# Patient Record
Sex: FEMALE | Race: WHITE | NOT HISPANIC OR LATINO | Employment: FULL TIME | URBAN - NONMETROPOLITAN AREA
[De-identification: names, ages, dates, MRNs, and addresses within clinical notes are randomized per-mention and may not be internally consistent; named-entity substitution may affect disease eponyms.]

---

## 2017-01-13 ENCOUNTER — OFFICE VISIT (OUTPATIENT)
Dept: FAMILY MEDICINE CLINIC | Facility: CLINIC | Age: 37
End: 2017-01-13

## 2017-01-13 VITALS
TEMPERATURE: 97.4 F | HEIGHT: 60 IN | DIASTOLIC BLOOD PRESSURE: 78 MMHG | SYSTOLIC BLOOD PRESSURE: 122 MMHG | BODY MASS INDEX: 34.36 KG/M2 | WEIGHT: 175 LBS | OXYGEN SATURATION: 98 % | HEART RATE: 88 BPM

## 2017-01-13 DIAGNOSIS — R53.83 FATIGUE, UNSPECIFIED TYPE: ICD-10-CM

## 2017-01-13 DIAGNOSIS — R61 NIGHT SWEATS: ICD-10-CM

## 2017-01-13 DIAGNOSIS — M54.50 ACUTE LOW BACK PAIN WITHOUT SCIATICA, UNSPECIFIED BACK PAIN LATERALITY: Primary | ICD-10-CM

## 2017-01-13 LAB
ALBUMIN SERPL-MCNC: 4.2 GM/DL (ref 3.2–5.5)
ALP SERPL-CCNC: 82 U/L (ref 15–121)
ALT SERPL-CCNC: 16 U/L (ref 10–60)
ANION GAP SERPL CALCULATED.3IONS-SCNC: 10 MMOL/L (ref 5–15)
AST SERPL-CCNC: 16 U/L (ref 10–60)
BASOPHILS NFR BLD AUTO: 0.6 % (ref 0–2)
BILIRUB BLD-MCNC: NEGATIVE MG/DL
BILIRUB SERPL-MCNC: 0.7 MG/DL (ref 0.2–1)
BUN SERPL-MCNC: 26 MG/DL (ref 8–25)
CALCIUM SERPL-MCNC: 9.3 MG/DL (ref 8.4–10.8)
CHLORIDE SERPL-SCNC: 102 MMOL/L (ref 100–112)
CLARITY, POC: CLEAR
CO2 SERPL-SCNC: 27 MMOL/L (ref 20–32)
COLOR UR: YELLOW
CREAT SERPL-MCNC: 1.3 MG/DL (ref 0.4–1.3)
EOSINOPHIL NFR BLD AUTO: 2.7 % (ref 0–7)
ERYTHROCYTE [DISTWIDTH] IN BLOOD: 14.2 % (ref 11.5–14.5)
GLUCOSE SERPL-MCNC: 123 MG/DL (ref 70–100)
GLUCOSE UR STRIP-MCNC: NEGATIVE MG/DL
GRANULOCYTES NFR BLD AUTO: 56.5 % (ref 37–80)
HCT VFR BLD CALC: 39.9 % (ref 35–45)
HGB BLD-MCNC: 13.1 GM/DL (ref 12–15.5)
KETONES UR QL: NEGATIVE
LEUKOCYTE EST, POC: NEGATIVE
LYMPHOCYTES NFR BLD AUTO: 31.1 % (ref 10–50)
MCH RBC QN: 29.3 PG (ref 26–34)
MCHC RBC-ENTMCNC: 32.8 GM/DL (ref 31.4–36)
MCV RBC: 89.3 FL (ref 80–98)
MONOCYTES NFR BLD AUTO: 9.1 % (ref 0–12)
NITRITE UR-MCNC: NEGATIVE MG/ML
NRBC BLD AUTO-RTO: 0 %
NRBC SPEC MANUAL: 0
PH UR: 5 [PH] (ref 5–8)
PLATELET # BLD: 364 X1000/MM3 (ref 150–450)
PMV BLD: 10 FL (ref 8–12)
POTASSIUM SERPL-SCNC: 3.6 MMOL/L (ref 3.4–5.4)
PROT SERPL-MCNC: 7.5 GM/DL (ref 6.7–8.2)
PROT UR STRIP-MCNC: NEGATIVE MG/DL
RBC # BLD: 4.47 MEGA/MM3 (ref 3.77–5.16)
RBC # UR STRIP: ABNORMAL /UL
SODIUM SERPL-SCNC: 139 MMOL/L (ref 134–146)
SP GR UR: 1.01 (ref 1–1.03)
UROBILINOGEN UR QL: NORMAL
WBC # BLD: 10.6 X1000/UL (ref 3.2–9.8)

## 2017-01-13 PROCEDURE — 99213 OFFICE O/P EST LOW 20 MIN: CPT | Performed by: NURSE PRACTITIONER

## 2017-01-13 PROCEDURE — 81003 URINALYSIS AUTO W/O SCOPE: CPT | Performed by: NURSE PRACTITIONER

## 2017-01-13 RX ORDER — ONDANSETRON 4 MG/1
4 TABLET, FILM COATED ORAL EVERY 8 HOURS PRN
Qty: 30 TABLET | Refills: 0 | Status: SHIPPED | OUTPATIENT
Start: 2017-01-13 | End: 2017-02-07

## 2017-01-13 RX ORDER — DOXYCYCLINE HYCLATE 100 MG/1
100 CAPSULE ORAL 2 TIMES DAILY
Qty: 28 CAPSULE | Refills: 0 | Status: SHIPPED | OUTPATIENT
Start: 2017-01-13 | End: 2017-01-27

## 2017-01-13 NOTE — MR AVS SNAPSHOT
Narcisa Maurice   1/13/2017 1:15 PM   Office Visit    Dept Phone:  [phone]   Encounter #:  [id number]    Provider:  LUIS MIGUEL Hernandez   Department:  [facility]                Your Full Care Plan              Today's Medication Changes          These changes are accurate as of: 1/13/17  2:42 PM.  If you have any questions, ask your nurse or doctor.               New Medication(s)Ordered:     doxycycline 100 MG capsule   Commonly known as:  VIBRAMYCIN   Take 1 capsule by mouth 2 (Two) Times a Day for 14 days.   Started by:  LUIS MIGUEL Hernandez       ondansetron 4 MG tablet   Commonly known as:  ZOFRAN   Take 1 tablet by mouth Every 8 (Eight) Hours As Needed for nausea or vomiting.   Started by:  LUIS MIGUEL Hernandez            Where to Get Your Medications      These medications were sent to [facility] [address] [phone] [facility] [phone] [address] [address]     Phone:  [phone]     doxycycline 100 MG capsule    ondansetron 4 MG tablet                  Your Updated Medication List          This list is accurate as of: 1/13/17  2:42 PM.  Always use your most recent med list.                buPROPion  MG 24 hr tablet   Commonly known as:  WELLBUTRIN XL   Take 1 tablet by mouth Daily.       doxycycline 100 MG capsule   Commonly known as:  VIBRAMYCIN   Take 1 capsule by mouth 2 (Two) Times a Day for 14 days.       fexofenadine 180 MG tablet   Commonly known as:  ALLEGRA       ondansetron 4 MG tablet   Commonly known as:  ZOFRAN   Take 1 tablet by mouth Every 8 (Eight) Hours As Needed for nausea or vomiting.               We Performed the Following     CBC & Differential     Comprehensive Metabolic Panel     Cytomegalovirus Antibody, IgG     Cytomegalovirus Antibody, IgM     Hortencia-Barr Virus VCA Antibody Panel     POC Urinalysis Dipstick, Automated       You Were  "Diagnosed With        Codes Comments    Acute low back pain without sciatica, unspecified back pain laterality    -  Primary ICD-10-CM: M54.5  ICD-9-CM: 724.2     Night sweats     ICD-10-CM: R61  ICD-9-CM: 780.8     Fatigue, unspecified type     ICD-10-CM: R53.83  ICD-9-CM: 780.79       Instructions     None    Patient Instructions History      Upcoming Appointments     Visit Type Date Time Department    [facility]      Bundlr Signup     [facility] Bundlr allows you to send messages to your doctor, view your test results, renew your prescriptions, schedule appointments, and more. To sign up, go to [employer] and click on the Sign Up Now link in the New User? box. Enter your Bundlr Activation Code exactly as it appears below along with the last four digits of your Social Security Number and your Date of Birth () to complete the sign-up process. If you do not sign up before the expiration date, you must request a new code.    B[employer]lr Activation Code: [email or web address]  Expires: 2017  2:42 PM    If you have questions, you can email [email or web address] or call [phone] to talk to our Bundlr staff. Remember, Bundlr is NOT to be used for urgent needs. For medical emergencies, dial 911.               Other Info from Your Visit           Allergies     Neomycin-bacitracin Zn-polymyx  Rash    Neosporin Plus Pain Relief Ms [Neomycin-polymyxin-pramoxine]  Rash      Reason for Visit     Back Pain x 2 days.     Night Sweats x2 weeks      Vital Signs     Blood Pressure Pulse Temperature Height    122/78 (BP Location: Left arm, Patient Position: Sitting, Cuff Size: Adult) 88 97.4 °F (36.3 °C) (Temporal Artery ) 60\" (152.4 cm)    Weight Oxygen Saturation Body Mass Index Smoking Status    175 lb (79.4 kg) 98% 34.18 kg/m2 Never Smoker      Problems and Diagnoses Noted     Acute low back pain without sciatica, unspecified back pain laterality    -  " Primary    Night sweats        Tiredness          Results     POC Urinalysis Dipstick, Automated      Component Value Standard Range & Units    Color Yellow Yellow, Straw, Dark Yellow, Erin    Clarity, UA Clear Clear    Glucose, UA Negative Negative, 1000 mg/dL (3+) mg/dL    Bilirubin Negative Negative    Ketones, UA Negative Negative    Specific Gravity  1.015 1.005 - 1.030    Blood, UA Trace Negative    pH, Urine 5.0 5.0 - 8.0    Protein, POC Negative Negative mg/dL    Urobilinogen, UA Normal Normal    Leukocytes Negative Negative    Nitrite, UA Negative Negative

## 2017-01-13 NOTE — PROGRESS NOTES
Chief Complaint   Patient presents with   • Back Pain     x 2 days.    • Night Sweats     x2 weeks       Subjective   HPI:   Narcisa Maurice is a 36 y.o. female presents to the office for evaluation of:  Back Pain   This is a new problem. The current episode started yesterday. The problem occurs constantly. The problem has been gradually worsening since onset. The pain is present in the lumbar spine. The quality of the pain is described as aching. The pain radiates to the right thigh and left thigh. The pain is at a severity of 6/10. The pain is moderate. The pain is worse during the night. The symptoms are aggravated by lying down. Associated symptoms include abdominal pain. Pertinent negatives include no bladder incontinence, bowel incontinence, chest pain, dysuria, fever, headaches, leg pain, numbness, paresis, paresthesias, pelvic pain, perianal numbness, tingling, weakness or weight loss. She has tried ice, NSAIDs and bed rest for the symptoms. The treatment provided no relief.   Night Sweats   This is a new problem. The current episode started 1 to 4 weeks ago. The problem occurs daily. The problem has been unchanged. Associated symptoms include abdominal pain and nausea. Pertinent negatives include no anorexia, arthralgias, change in bowel habit, chest pain, chills, congestion, coughing, diaphoresis, fatigue, fever, headaches, joint swelling, myalgias, neck pain, numbness, rash, sore throat, swollen glands, urinary symptoms, vertigo, visual change, vomiting or weakness. The symptoms are aggravated by coughing.     She c/o fatigue, night sweats, and back pain X 2 weeks. She was treated for lyme and RMSF in the summer of 2016. She has been off doxy therapy for several months.   She denies coughing and unintentional weight loss.   She denies known contact with mono, strep and flu    Family History   Problem Relation Age of Onset   • Thyroid disease Mother    • Heart disease Brother      Social History  "    Social History   • Marital status: Single     Spouse name: N/A   • Number of children: 2   • Years of education: N/A     Occupational History   • Not on file.     Social History Main Topics   • Smoking status: Never Smoker   • Smokeless tobacco: Never Used   • Alcohol use No   • Drug use: No   • Sexual activity: Yes     Partners: Male     Other Topics Concern   • Not on file     Social History Narrative       Review of Systems   Constitutional: Positive for night sweats. Negative for chills, diaphoresis, fatigue, fever and weight loss.   HENT: Negative for congestion and sore throat.    Respiratory: Negative for cough.    Cardiovascular: Negative for chest pain.   Gastrointestinal: Positive for abdominal pain and nausea. Negative for anorexia, bowel incontinence, change in bowel habit and vomiting.   Genitourinary: Negative for bladder incontinence, dysuria and pelvic pain.   Musculoskeletal: Positive for back pain. Negative for arthralgias, joint swelling, myalgias and neck pain.   Skin: Negative for rash.   Neurological: Negative for vertigo, tingling, weakness, numbness, headaches and paresthesias.     14 Point ROS completed with pertinent positives discussed. All other systems reviewed and are negative.     The following portions of the patient's history were reviewed and updated as appropriate: allergies, current medications, past family history, past medical history, past social history, past surgical history and problem list.    Encounter Vitals:  Vitals:    01/13/17 1318   BP: 122/78   BP Location: Left arm   Patient Position: Sitting   Cuff Size: Adult   Pulse: 88   Temp: 97.4 °F (36.3 °C)   TempSrc: Temporal Artery    SpO2: 98%   Weight: 175 lb (79.4 kg)   Height: 60\" (152.4 cm)   PainSc:   6   PainLoc: Back       Objective:  Physical Exam   Constitutional: She is oriented to person, place, and time. Vital signs are normal. She appears well-developed and well-nourished.  Non-toxic appearance.   HENT: "   Head: Normocephalic and atraumatic.   Right Ear: Tympanic membrane, external ear and ear canal normal.   Left Ear: Tympanic membrane, external ear and ear canal normal.   Nose: Nose normal.   Mouth/Throat: Uvula is midline, oropharynx is clear and moist and mucous membranes are normal. No posterior oropharyngeal edema or posterior oropharyngeal erythema.   Eyes: Lids are normal. Pupils are equal, round, and reactive to light.   Neck: Trachea normal and normal range of motion. Neck supple. No thyroid mass present.   Cardiovascular: Normal rate, regular rhythm, S1 normal, S2 normal, normal heart sounds and intact distal pulses.  Exam reveals no gallop and no friction rub.    No murmur heard.  Pulmonary/Chest: Effort normal and breath sounds normal. No respiratory distress. She has no wheezes. She has no rales.   Abdominal: Soft. Normal appearance and bowel sounds are normal. She exhibits no mass. There is no rebound, no guarding and no CVA tenderness.   Musculoskeletal: Normal range of motion.   Lymphadenopathy:     She has cervical adenopathy.        Right cervical: Superficial cervical adenopathy present.        Left cervical: Superficial cervical adenopathy present.   Neurological: She is alert and oriented to person, place, and time. She has normal strength. No cranial nerve deficit or sensory deficit. Gait normal.   Skin: Skin is warm and dry. No rash noted.   Psychiatric: She has a normal mood and affect. Her speech is normal and behavior is normal. Judgment and thought content normal. Cognition and memory are normal.   Nursing note and vitals reviewed.    Pertinent Labs  No visits with results within 3 Month(s) from this visit.  Latest known visit with results is:    Hospital Outpatient Visit on 10/13/2016   Component Date Value Ref Range Status   • LH 10/13/2016 6.4  mIU/mL Final    Comment: Females:  Folicular Phase:    2.6 - 12.1 mIU/mL  Mid Cycle Peak:     27.3 - 96.9 mIU/mL  Luteal Phase:        0.8 -  15.5 mIU/mL  Postmenopausal:    13.1 - 86.5 mIU/mL    Males:  1.31-10.5 mIU/ml         • FSH 10/13/2016 2.8  mIU/mL Final    Comment: Females:  Folicular Phase:    2.0 - 11.6 mIU/mL  Midcycle Peak:      5.1 - 23.4 mIU/mL  Luteal Phase:       1.4 - 9.6 mIU/mL  Postmenopausal:   21.5 - 131.0 mIU/mL       • TSH 10/13/2016 2.76  0.46 - 4.68 uIU/ml Final   • Vitamin B-12 10/13/2016 >1000* 239 - 931 pg/ml Final     Labs have been independently reviewed.    Key Imaging/Tracings/POC Testing  Brief Urine Lab Results  (Last result in the past 365 days)      Color Clarity Blood Leuk Est Nitrite Protein CREAT      01/13/17 1412 Yellow Clear Trace(A) Negative Negative Negative            Assessment and Plan:  Problem List Items Addressed This Visit     None      Visit Diagnoses     Acute low back pain without sciatica, unspecified back pain laterality    -  Primary    Relevant Orders    POC Urinalysis Dipstick, Automated    Night sweats        Fatigue, unspecified type        Relevant Orders    CBC & Differential    Comprehensive Metabolic Panel    Hortencia-Barr Virus VCA Antibody Panel    Cytomegalovirus Antibody, IgM    Cytomegalovirus Antibody, IgG        Narcisa was seen today for back pain and night sweats.    Diagnoses and all orders for this visit:    Acute low back pain without sciatica, unspecified back pain laterality  -     POC Urinalysis Dipstick, Automated    Night sweats    Fatigue, unspecified type  -     CBC & Differential  -     Comprehensive Metabolic Panel  -     Hortencia-Barr Virus VCA Antibody Panel  -     Cytomegalovirus Antibody, IgM  -     Cytomegalovirus Antibody, IgG    Other orders  -     doxycycline (VIBRAMYCIN) 100 MG capsule; Take 1 capsule by mouth 2 (Two) Times a Day for 14 days.  -     ondansetron (ZOFRAN) 4 MG tablet; Take 1 tablet by mouth Every 8 (Eight) Hours As Needed for nausea or vomiting.      Side effects of ordered medications discussed with patient.     Additional  Notes/Instructions  Continue to increase PO intake water  If acute back/flank/abd pain, fever, or confusion develops go to ER or call 911    Follow-Up  Return if symptoms worsen or fail to improve.    Patient/caregiver verbalizes understanding of all orders and instructions in this plan of care.

## 2017-01-16 DIAGNOSIS — R94.4 DECREASED GFR: Primary | ICD-10-CM

## 2017-01-17 LAB
CMV IGG SERPL IA-ACNC: 3.5 U/ML (ref 0–0.59)
CMV IGM SERPL IA-ACNC: <30 AU/ML (ref 0–29.9)

## 2017-01-18 DIAGNOSIS — Z20.828 MONO EXPOSURE: Primary | ICD-10-CM

## 2017-01-20 DIAGNOSIS — R79.9 ABNORMAL BLOOD CHEMISTRY: Primary | ICD-10-CM

## 2017-01-20 LAB
ALBUMIN SERPL-MCNC: 4 GM/DL (ref 3.2–5.5)
ALP SERPL-CCNC: 69 U/L (ref 15–121)
ALT SERPL-CCNC: 21 U/L (ref 10–60)
ANION GAP SERPL CALCULATED.3IONS-SCNC: 7 MMOL/L (ref 5–15)
AST SERPL-CCNC: 20 U/L (ref 10–60)
BILIRUB SERPL-MCNC: 0.8 MG/DL (ref 0.2–1)
BUN SERPL-MCNC: 18 MG/DL (ref 8–25)
CALCIUM SERPL-MCNC: 9.3 MG/DL (ref 8.4–10.8)
CHLORIDE SERPL-SCNC: 106 MMOL/L (ref 100–112)
CO2 SERPL-SCNC: 27 MMOL/L (ref 20–32)
CREAT SERPL-MCNC: 1.1 MG/DL (ref 0.4–1.3)
GLUCOSE SERPL-MCNC: 87 MG/DL (ref 70–100)
POTASSIUM SERPL-SCNC: 4.5 MMOL/L (ref 3.4–5.4)
PROT SERPL-MCNC: 7.2 GM/DL (ref 6.7–8.2)
SODIUM SERPL-SCNC: 140 MMOL/L (ref 134–146)

## 2017-01-30 ENCOUNTER — APPOINTMENT (OUTPATIENT)
Dept: CT IMAGING | Facility: HOSPITAL | Age: 37
End: 2017-01-30

## 2017-01-30 ENCOUNTER — OFFICE VISIT (OUTPATIENT)
Dept: FAMILY MEDICINE CLINIC | Facility: CLINIC | Age: 37
End: 2017-01-30

## 2017-01-30 ENCOUNTER — LAB (OUTPATIENT)
Dept: LAB | Facility: OTHER | Age: 37
End: 2017-01-30

## 2017-01-30 VITALS
SYSTOLIC BLOOD PRESSURE: 136 MMHG | WEIGHT: 179 LBS | HEIGHT: 60 IN | HEART RATE: 105 BPM | BODY MASS INDEX: 35.14 KG/M2 | OXYGEN SATURATION: 98 % | DIASTOLIC BLOOD PRESSURE: 86 MMHG | TEMPERATURE: 97.7 F

## 2017-01-30 DIAGNOSIS — R11.0 NAUSEA: ICD-10-CM

## 2017-01-30 DIAGNOSIS — R10.9 FLANK PAIN: ICD-10-CM

## 2017-01-30 DIAGNOSIS — K59.00 CONSTIPATION, UNSPECIFIED CONSTIPATION TYPE: ICD-10-CM

## 2017-01-30 DIAGNOSIS — R10.9 STOMACH PAIN: Primary | ICD-10-CM

## 2017-01-30 DIAGNOSIS — N20.0 RENAL CALCULI: ICD-10-CM

## 2017-01-30 LAB
ALBUMIN SERPL-MCNC: 4.4 G/DL (ref 3.2–5.5)
ALBUMIN/GLOB SERPL: 1.3 G/DL (ref 1–3)
ALP SERPL-CCNC: 70 U/L (ref 15–121)
ALT SERPL W P-5'-P-CCNC: 15 U/L (ref 10–60)
ANION GAP SERPL CALCULATED.3IONS-SCNC: 6 MMOL/L (ref 5–15)
AST SERPL-CCNC: 18 U/L (ref 10–60)
BILIRUB BLD-MCNC: ABNORMAL MG/DL
BILIRUB SERPL-MCNC: 1.2 MG/DL (ref 0.2–1)
BUN BLD-MCNC: 23 MG/DL (ref 8–25)
BUN/CREAT SERPL: 19.2 (ref 7–25)
CALCIUM SPEC-SCNC: 9.2 MG/DL (ref 8.4–10.8)
CHLORIDE SERPL-SCNC: 102 MMOL/L (ref 100–112)
CLARITY, POC: CLEAR
CO2 SERPL-SCNC: 30 MMOL/L (ref 20–32)
COLOR UR: ABNORMAL
CREAT BLD-MCNC: 1.2 MG/DL (ref 0.4–1.3)
GFR SERPL CREATININE-BSD FRML MDRD: 51 ML/MIN/1.73 (ref 64–149)
GLOBULIN UR ELPH-MCNC: 3.3 GM/DL (ref 2.5–4.6)
GLUCOSE BLD-MCNC: 91 MG/DL (ref 70–100)
GLUCOSE UR STRIP-MCNC: NEGATIVE MG/DL
KETONES UR QL: ABNORMAL
LEUKOCYTE EST, POC: NEGATIVE
NITRITE UR-MCNC: NEGATIVE MG/ML
PH UR: 6 [PH] (ref 5–8)
POTASSIUM BLD-SCNC: 4.2 MMOL/L (ref 3.4–5.4)
PROT SERPL-MCNC: 7.7 G/DL (ref 6.7–8.2)
PROT UR STRIP-MCNC: ABNORMAL MG/DL
RBC # UR STRIP: ABNORMAL /UL
SODIUM BLD-SCNC: 138 MMOL/L (ref 134–146)
SP GR UR: 1.03 (ref 1–1.03)
UROBILINOGEN UR QL: NORMAL

## 2017-01-30 PROCEDURE — 99214 OFFICE O/P EST MOD 30 MIN: CPT | Performed by: NURSE PRACTITIONER

## 2017-01-30 PROCEDURE — 86664 EPSTEIN-BARR NUCLEAR ANTIGEN: CPT | Performed by: NURSE PRACTITIONER

## 2017-01-30 PROCEDURE — 86665 EPSTEIN-BARR CAPSID VCA: CPT | Performed by: NURSE PRACTITIONER

## 2017-01-30 PROCEDURE — 96372 THER/PROPH/DIAG INJ SC/IM: CPT | Performed by: NURSE PRACTITIONER

## 2017-01-30 PROCEDURE — 81003 URINALYSIS AUTO W/O SCOPE: CPT | Performed by: NURSE PRACTITIONER

## 2017-01-30 PROCEDURE — 80053 COMPREHEN METABOLIC PANEL: CPT | Performed by: NURSE PRACTITIONER

## 2017-01-30 PROCEDURE — 87086 URINE CULTURE/COLONY COUNT: CPT | Performed by: NURSE PRACTITIONER

## 2017-01-30 PROCEDURE — 86663 EPSTEIN-BARR ANTIBODY: CPT | Performed by: NURSE PRACTITIONER

## 2017-01-30 RX ORDER — KETOROLAC TROMETHAMINE 30 MG/ML
60 INJECTION, SOLUTION INTRAMUSCULAR; INTRAVENOUS ONCE
Status: COMPLETED | OUTPATIENT
Start: 2017-01-30 | End: 2017-01-30

## 2017-01-30 RX ORDER — KETOROLAC TROMETHAMINE 10 MG/1
10 TABLET, FILM COATED ORAL EVERY 6 HOURS PRN
Qty: 20 TABLET | Refills: 0 | Status: SHIPPED | OUTPATIENT
Start: 2017-01-30 | End: 2017-02-07

## 2017-01-30 RX ORDER — CIPROFLOXACIN 500 MG/1
500 TABLET, FILM COATED ORAL 2 TIMES DAILY
Qty: 14 TABLET | Refills: 0 | Status: SHIPPED | OUTPATIENT
Start: 2017-01-30 | End: 2017-02-07

## 2017-01-30 RX ORDER — DOXYCYCLINE HYCLATE 100 MG/1
100 CAPSULE ORAL 2 TIMES DAILY
COMMUNITY
End: 2017-02-07

## 2017-01-30 RX ADMIN — KETOROLAC TROMETHAMINE 60 MG: 30 INJECTION, SOLUTION INTRAMUSCULAR; INTRAVENOUS at 15:36

## 2017-01-30 NOTE — MR AVS SNAPSHOT
Narcisa Maurice   2017 8:15 AM   Office Visit    Dept Phone:  [phone]   Encounter #:  [id number]    Provider:  LUIS MIGUEL Hernandez   Department:  [facility]                Your Full Care Plan              Your Updated Medication List          This list is accurate as of: 17 11:29 AM.  Always use your most recent med list.                buPROPion  MG 24 hr tablet   Commonly known as:  WELLBUTRIN XL   Take 1 tablet by mouth Daily.       doxycycline 100 MG capsule   Commonly known as:  VIBRAMYCIN       fexofenadine 180 MG tablet   Commonly known as:  ALLEGRA       ondansetron 4 MG tablet   Commonly known as:  ZOFRAN   Take 1 tablet by mouth Every 8 (Eight) Hours As Needed for nausea or vomiting.               We Performed the Following     CT Abdomen Pelvis Stone Protocol     POC Urinalysis Dipstick, Automated     XR Abdomen KUB       You Were Diagnosed With        Codes Comments    Stomach pain    -  Primary ICD-10-CM: R10.9  ICD-9-CM: 536.8     Nausea     ICD-10-CM: R11.0  ICD-9-CM: 787.02     Flank pain     ICD-10-CM: R10.9  ICD-9-CM: 789.09       Instructions     None    Patient Instructions History      Upcoming Appointments     Visit Type Date Time Department    OFFICE VISIT 2017  8:15 AM [facility]    XR MAD ABD KUB 2017  8:45 AM [facility] [place]    LAB 2017  9:10 AM [facility] [place]    CT MAD ABD PELVIS STONE PROTOCOL 2017  9:30 AM [facility] [place]      MyChart Signup     [employer] allows you to send messages to your doctor, view your test results, renew your prescriptions, schedule appointments, and more. To sign up, go to [employer] and click on the Sign Up Now link in the New User? box. Enter your [employer] Activation Code exactly as it appears below along with the last four digits of your Social Security Number and your Date of Birth () to complete the  "sign-up process. If you do not sign up before the expiration date, you must request a new code.    [employer] Activation Code: [email or web address]  Expires: 2/13/2017  9:23 AM    If you have questions, you can email [email or web address] or call [phone] to talk to our MyChart staff. Remember, [employer] is NOT to be used for urgent needs. For medical emergencies, dial 911.               Other Info from Your Visit           Allergies     Neomycin-bacitracin Zn-polymyx  Rash    Neosporin Plus Pain Relief Ms [Neomycin-polymyxin-pramoxine]  Rash      Reason for Visit     Back Pain F/u for stomach and back pain from earlier this month. Back pain is more so on right side not.     Abdominal Pain     Difficulty Urinating painful urination and going more frequently now.       Vital Signs     Blood Pressure Pulse Temperature Height Weight Oxygen Saturation    136/86 105 97.7 °F (36.5 °C) 60\" (152.4 cm) 179 lb (81.2 kg) 98%    Body Mass Index Smoking Status                34.96 kg/m2 Never Smoker          Problems and Diagnoses Noted     Stomach ache    -  Primary    Nauseous        Flank pain          Results     POC Urinalysis Dipstick, Automated      Component Value Standard Range & Units    Color Dark Yellow Yellow, Straw, Dark Yellow, Erin    Clarity, UA Clear Clear    Glucose, UA Negative Negative, 1000 mg/dL (3+) mg/dL    Bilirubin Small (1+) Negative    Ketones, UA Trace Negative    Specific Gravity  1.030 1.005 - 1.030    Blood, UA Trace Negative    pH, Urine 6.0 5.0 - 8.0    Protein, POC Trace Negative mg/dL    Urobilinogen, UA Normal Normal    Leukocytes Negative Negative    Nitrite, UA Negative Negative                CT Abdomen Pelvis Stone Protocol               "

## 2017-01-30 NOTE — PROGRESS NOTES
Chief Complaint   Patient presents with   • Back Pain     F/u for stomach and back pain from earlier this month. Back pain is more so on right side not.    • Abdominal Pain   • Difficulty Urinating     painful urination and going more frequently now.        Subjective   HPI:   Narcisa Maurice is a 36 y.o. female presents to the office for evaluation of:  Back Pain   This is a new problem. The current episode started 1 to 4 weeks ago. The problem occurs constantly. The problem has been gradually worsening since onset. Pain location: right flank. The quality of the pain is described as aching. The pain does not radiate. The pain is at a severity of 6/10. The pain is moderate. The pain is the same all the time. The symptoms are aggravated by sitting. Associated symptoms include abdominal pain and dysuria. Pertinent negatives include no bladder incontinence, bowel incontinence, chest pain, fever, headaches, numbness, paresis, paresthesias, pelvic pain, perianal numbness, tingling or weakness. She has tried nothing for the symptoms. The treatment provided no relief.   Abdominal Pain   This is a recurrent problem. The current episode started 1 to 4 weeks ago. The onset quality is gradual. The problem occurs constantly. The problem has been gradually worsening. The pain is located in the right flank. The pain is moderate. The abdominal pain radiates to the RLQ and LLQ. Associated symptoms include belching and dysuria. Pertinent negatives include no anorexia, arthralgias, constipation, diarrhea, fever, flatus, headaches, hematochezia, hematuria, melena, myalgias or nausea. The pain is aggravated by eating. The pain is relieved by recumbency. She has tried nothing for the symptoms. The treatment provided no relief. There is no history of abdominal surgery, colon cancer, GERD, irritable bowel syndrome or ulcerative colitis.   Cystitis   This is a new problem. The current episode started 1 to 4 weeks ago. The problem  occurs intermittently. The problem has been unchanged. Associated symptoms include abdominal pain. Pertinent negatives include no anorexia, arthralgias, chest pain, chills, congestion, coughing, fatigue, fever, headaches, myalgias, nausea, neck pain, numbness, rash, sore throat or weakness. Nothing aggravates the symptoms. Treatments tried: increased water intake. The treatment provided no relief.         Family History   Problem Relation Age of Onset   • Thyroid disease Mother    • Heart disease Brother      Social History     Social History   • Marital status: Single     Spouse name: N/A   • Number of children: 2   • Years of education: N/A     Occupational History   • Not on file.     Social History Main Topics   • Smoking status: Never Smoker   • Smokeless tobacco: Never Used   • Alcohol use No   • Drug use: No   • Sexual activity: Yes     Partners: Male     Other Topics Concern   • Not on file     Social History Narrative       Review of Systems   Constitutional: Negative.  Negative for activity change, chills, fatigue, fever and unexpected weight change.   HENT: Negative.  Negative for congestion, ear pain, postnasal drip, rhinorrhea, sinus pressure and sore throat.    Eyes: Negative.  Negative for pain and redness.   Respiratory: Negative.  Negative for cough, choking, chest tightness, shortness of breath and wheezing.    Cardiovascular: Negative.  Negative for chest pain, palpitations and leg swelling.   Gastrointestinal: Positive for abdominal pain. Negative for abdominal distention, anorexia, bowel incontinence, constipation, diarrhea, flatus, hematochezia, melena, nausea and rectal pain.   Endocrine: Negative.    Genitourinary: Positive for dysuria and flank pain. Negative for bladder incontinence, decreased urine volume, difficulty urinating, hematuria, pelvic pain and urgency.   Musculoskeletal: Positive for back pain. Negative for arthralgias, gait problem, myalgias and neck pain.   Skin: Negative.   "Negative for rash and wound.   Allergic/Immunologic: Negative.    Neurological: Negative.  Negative for dizziness, tingling, syncope, weakness, light-headedness, numbness, headaches and paresthesias.   Hematological: Negative.    Psychiatric/Behavioral: Negative.  Negative for agitation, behavioral problems, confusion, self-injury, sleep disturbance and suicidal ideas. The patient is not nervous/anxious.      14 Point ROS completed with pertinent positives discussed. All other systems reviewed and are negative.     The following portions of the patient's history were reviewed and updated as appropriate: allergies, current medications, past family history, past medical history, past social history, past surgical history and problem list.    Encounter Vitals:  Vitals:    01/30/17 0804   BP: 136/86   Pulse: 105   Temp: 97.7 °F (36.5 °C)   SpO2: 98%   Weight: 179 lb (81.2 kg)   Height: 60\" (152.4 cm)   PainSc:   6   PainLoc: Back       Objective:  Physical Exam   Constitutional: She is oriented to person, place, and time. Vital signs are normal. She appears well-developed and well-nourished.   HENT:   Head: Normocephalic and atraumatic.   Right Ear: Tympanic membrane, external ear and ear canal normal.   Left Ear: Tympanic membrane, external ear and ear canal normal.   Nose: Nose normal.   Mouth/Throat: Uvula is midline, oropharynx is clear and moist and mucous membranes are normal. No posterior oropharyngeal edema or posterior oropharyngeal erythema.   Eyes: Lids are normal. Pupils are equal, round, and reactive to light.   Neck: Trachea normal and normal range of motion. Neck supple. No thyroid mass present.   Cardiovascular: Normal rate, regular rhythm, S1 normal, S2 normal, normal heart sounds and intact distal pulses.  Exam reveals no gallop and no friction rub.    No murmur heard.  Pulmonary/Chest: Effort normal and breath sounds normal. No respiratory distress. She has no wheezes. She has no rales.   Abdominal: " Soft. Normal appearance and bowel sounds are normal. She exhibits no mass. There is tenderness in the right lower quadrant, suprapubic area and left lower quadrant. There is CVA tenderness. There is no rebound, no guarding and negative Whitaker's sign.   Right flank pain, right CVA tenderness   Musculoskeletal: Normal range of motion.   Lymphadenopathy:     She has no cervical adenopathy.   Neurological: She is alert and oriented to person, place, and time. She has normal strength. No cranial nerve deficit or sensory deficit. Gait normal.   Skin: Skin is warm and dry. No rash noted.   Psychiatric: She has a normal mood and affect. Her speech is normal and behavior is normal. Judgment and thought content normal. Cognition and memory are normal.   Nursing note and vitals reviewed.    Pertinent Labs  Office Visit on 01/30/2017   Component Date Value Ref Range Status   • Color 01/30/2017 Dark Yellow  Yellow, Straw, Dark Yellow, Erin Final   • Clarity, UA 01/30/2017 Clear  Clear Final   • Glucose, UA 01/30/2017 Negative  Negative, 1000 mg/dL (3+) mg/dL Final   • Bilirubin 01/30/2017 Small (1+)* Negative Final   • Ketones, UA 01/30/2017 Trace* Negative Final   • Specific Gravity  01/30/2017 1.030  1.005 - 1.030 Final   • Blood, UA 01/30/2017 Trace* Negative Final   • pH, Urine 01/30/2017 6.0  5.0 - 8.0 Final   • Protein, POC 01/30/2017 Trace* Negative mg/dL Final   • Urobilinogen, UA 01/30/2017 Normal  Normal Final   • Leukocytes 01/30/2017 Negative  Negative Final   • Nitrite, UA 01/30/2017 Negative  Negative Final   Orders Only on 01/20/2017   Component Date Value Ref Range Status   • Glucose 01/30/2017 91  70 - 100 mg/dL Final   • BUN 01/30/2017 23  8 - 25 mg/dL Final   • Creatinine 01/30/2017 1.20  0.40 - 1.30 mg/dL Final   • Sodium 01/30/2017 138  134 - 146 mmol/L Final   • Potassium 01/30/2017 4.2  3.4 - 5.4 mmol/L Final   • Chloride 01/30/2017 102  100 - 112 mmol/L Final   • CO2 01/30/2017 30.0  20.0 - 32.0 mmol/L  Final   • Calcium 01/30/2017 9.2  8.4 - 10.8 mg/dL Final   • Total Protein 01/30/2017 7.7  6.7 - 8.2 g/dL Final   • Albumin 01/30/2017 4.40  3.20 - 5.50 g/dL Final   • ALT (SGPT) 01/30/2017 15  10 - 60 U/L Final   • AST (SGOT) 01/30/2017 18  10 - 60 U/L Final   • Alkaline Phosphatase 01/30/2017 70  15 - 121 U/L Final   • Total Bilirubin 01/30/2017 1.2* 0.2 - 1.0 mg/dL Final   • eGFR Non African Amer 01/30/2017 51* 64 - 149 mL/min/1.73 Final   • Globulin 01/30/2017 3.3  2.5 - 4.6 gm/dL Final   • A/G Ratio 01/30/2017 1.3  1.0 - 3.0 g/dL Final   • BUN/Creatinine Ratio 01/30/2017 19.2  7.0 - 25.0 Final   • Anion Gap 01/30/2017 6.0  5.0 - 15.0 mmol/L Final   Orders Only on 01/16/2017   Component Date Value Ref Range Status   • Glucose 01/20/2017 87  70.0 - 100.0 mg/dl Final   • BUN 01/20/2017 18  8.0 - 25.0 mg/dl Final   • Creatinine 01/20/2017 1.1  0.4 - 1.3 mg/dl Final   • Sodium 01/20/2017 140.0  134 - 146 mmol/L Final   • Potassium 01/20/2017 4.5  3.4 - 5.4 mmol/L Final   • Chloride 01/20/2017 106.0  100.0 - 112.0 mmol/L Final   • CO2 01/20/2017 27.0  20.0 - 32.0 mmol/L Final   • Calcium 01/20/2017 9.3  8.4 - 10.8 mg/dl Final   • Total Protein 01/20/2017 7.2  6.7 - 8.2 gm/dl Final   • Albumin 01/20/2017 4.0  3.2 - 5.5 gm/dl Final   • Total Bilirubin 01/20/2017 0.8  0.2 - 1.0 mg/dl Final   • Alkaline Phosphatase 01/20/2017 69  15 - 121 U/L Final   • ALT (SGPT) 01/20/2017 21  10 - 60 U/L Final   • AST (SGOT) 01/20/2017 20  10 - 60 U/L Final   • GFR MDRD Non  01/20/2017 57* 64 - 149 mL/min/1.73 sq.M Final    Comment: Invalid if creatinine is changing or the patient is on dialysis. Use AA  result if patient is -American, non AA result otherwise.     • GFR MDRD  01/20/2017 69  64 - 149 mL/min/1.73 sq.M Final   • Anion Gap 01/20/2017 7.0  5.0 - 15.0 mmol/L Final   Hospital Outpatient Visit on 01/13/2017   Component Date Value Ref Range Status   • EBV VCA IgM 01/13/2017 <36.0  0.0 -  35.9 U/mL Final    Comment:                                  Negative        <36.0                                   Equivocal 36.0 - 43.9                                   Positive        >43.9     • EBV Early Antigen Ab, IgG 01/13/2017 24.9* 0.0 - 8.9 U/mL Final    Comment: Hepatitis A, Hepatitis C and HIV antibodies may cross-react  with this assay.                                   Negative        < 9.0                                   Equivocal  9.0 - 10.9                                   Positive        >10.9     • EBV VCA IgG 01/13/2017 >600.0* 0.0 - 17.9 U/mL Final    Comment:                                  Negative        <18.0                                   Equivocal 18.0 - 21.9                                   Positive        >21.9     • EBV Nuclear Antigen Ab, IgG 01/13/2017 426.0* 0.0 - 17.9 U/mL Final    Comment:                                  Negative        <18.0                                   Equivocal 18.0 - 21.9                                   Positive        >21.9     • Interpretation 01/13/2017 Comment   Final    Comment:                EBV Interpretation Chart  Interpretation   EBV-IgM  EA(D)-IgG  VCA-IgG  EBNA-IgG  EBV Seronegative    -        -         -          -  Early Phase         +        -         -          -  Acute Primary       +       +or-       +          -  Infection  Convalescence/Past  -       +or-       +          +  Infection  Reactivated        +or-      +         +          +  Infection         + Antibody Present      - Antibody Absent  Performed at:  [facility]  [id number]  : Mickey Horan PhD, Phone:  [phone]     Office Visit on 01/13/2017   Component Date Value Ref Range Status   • Color 01/13/2017 Yellow  Yellow, Straw, Dark Yellow, Erin Final   • Clarity, UA 01/13/2017 Clear  Clear Final   • Glucose, UA 01/13/2017 Negative  Negative, 1000 mg/dL (3+) mg/dL Final   • Bilirubin 01/13/2017 Negative   Negative Final   • Ketones, UA 01/13/2017 Negative  Negative Final   • Specific Gravity  01/13/2017 1.015  1.005 - 1.030 Final   • Blood, UA 01/13/2017 Trace* Negative Final   • pH, Urine 01/13/2017 5.0  5.0 - 8.0 Final   • Protein, POC 01/13/2017 Negative  Negative mg/dL Final   • Urobilinogen, UA 01/13/2017 Normal  Normal Final   • Leukocytes 01/13/2017 Negative  Negative Final   • Nitrite, UA 01/13/2017 Negative  Negative Final   • WBC 01/13/2017 10.6* 3.2 - 9.8 x1000/uL Final   • RBC 01/13/2017 4.47  3.77 - 5.16 maverick/mm3 Final   • Hemoglobin 01/13/2017 13.1  12.0 - 15.5 gm/dl Final   • Hematocrit 01/13/2017 39.9  35.0 - 45.0 % Final   • MCV 01/13/2017 89.3  80.0 - 98.0 fl Final   • MCH 01/13/2017 29.3  26.0 - 34.0 pg Final   • MCHC 01/13/2017 32.8  31.4 - 36.0 gm/dl Final   • Platelets 01/13/2017 364  150 - 450 x1000/mm3 Final   • RDW 01/13/2017 14.2  11.5 - 14.5 % Final   • MPV 01/13/2017 10.0  8.0 - 12.0 fl Final   • Neutrophil Rel % 01/13/2017 56.5  37.0 - 80.0 % Final   • Lymphocyte Rel % 01/13/2017 31.1  10.0 - 50.0 % Final   • Monocyte Rel % 01/13/2017 9.1  0.0 - 12.0 % Final   • Eosinophil Rel % 01/13/2017 2.7  0.0 - 7.0 % Final   • Basophil Rel % 01/13/2017 0.6  0.0 - 2.0 % Final   • nRBC 01/13/2017 0   Final   • nRBC 01/13/2017 0   Final   • Glucose 01/13/2017 123* 70.0 - 100.0 mg/dl Final   • BUN 01/13/2017 26* 8.0 - 25.0 mg/dl Final   • Creatinine 01/13/2017 1.3  0.4 - 1.3 mg/dl Final   • Sodium 01/13/2017 139.0  134 - 146 mmol/L Final   • Potassium 01/13/2017 3.6  3.4 - 5.4 mmol/L Final   • Chloride 01/13/2017 102.0  100.0 - 112.0 mmol/L Final   • CO2 01/13/2017 27.0  20.0 - 32.0 mmol/L Final   • Calcium 01/13/2017 9.3  8.4 - 10.8 mg/dl Final   • Total Protein 01/13/2017 7.5  6.7 - 8.2 gm/dl Final   • Albumin 01/13/2017 4.2  3.2 - 5.5 gm/dl Final   • Total Bilirubin 01/13/2017 0.7  0.2 - 1.0 mg/dl Final   • Alkaline Phosphatase 01/13/2017 82  15 - 121 U/L Final   • ALT (SGPT) 01/13/2017 16  10 - 60  U/L Final   • AST (SGOT) 01/13/2017 16  10 - 60 U/L Final   • GFR MDRD Non  01/13/2017 47* 64 - 149 mL/min/1.73 sq.M Final    Comment: Invalid if creatinine is changing or the patient is on dialysis. Use AA  result if patient is -American, non AA result otherwise.     • GFR MDRD  01/13/2017 57* 64 - 149 mL/min/1.73 sq.M Final   • Anion Gap 01/13/2017 10.0  5.0 - 15.0 mmol/L Final   • CMV IgM 01/13/2017 <30.0  0.0 - 29.9 AU/mL Final    Comment:                                 Negative         <30.0                                  Equivocal  30.0 - 34.9                                  Positive         >34.9  A positive result is generally indicative of acute  infection, reactivation or persistent IgM production.  Performed at:  [facility]  [id number]  : Mickey Horan PhD, Phone:  [phone]     • CMV IgG 01/13/2017 3.50* 0.00 - 0.59 U/mL Final    Comment:                                Negative          <0.60                                 Equivocal   0.60 - 0.69                                 Positive          >0.69  Performed at:  [facility]  [id number]  : Mickey Horan PhD, Phone:  [phone]       Labs have been independently reviewed.    Key Imaging/Tracings/POC Testing  KUB independently reviewed and reviewed with Dr. Arevalo  No evidence of SBO    CT report viewed.   CT images independently  Reviewed- bilateral non-obstructing renal calculi   Assessment and Plan:  Problem List Items Addressed This Visit     None      Visit Diagnoses     Stomach pain    -  Primary    Relevant Orders    XR Abdomen KUB    CT Abdomen Pelvis Stone Protocol (Completed)    Nausea        Relevant Orders    CT Abdomen Pelvis Stone Protocol (Completed)    Flank pain        Relevant Medications    ketorolac (TORADOL) injection 60 mg (Completed)    Other Relevant Orders    POC Urinalysis  Dipstick, Automated (Completed)    CT Abdomen Pelvis Stone Protocol (Completed)    Ambulatory Referral to Urology    Urine Culture (Clean Catch)    Urinalysis (clean catch)    Renal calculi        Relevant Medications    ketorolac (TORADOL) injection 60 mg (Completed)    Other Relevant Orders    Ambulatory Referral to Urology    Constipation, unspecified constipation type            Narcisa was seen today for back pain, abdominal pain and difficulty urinating.    Diagnoses and all orders for this visit:    Stomach pain  -     XR Abdomen KUB  -     CT Abdomen Pelvis Stone Protocol    Nausea  -     CT Abdomen Pelvis Stone Protocol    Flank pain  -     POC Urinalysis Dipstick, Automated  -     CT Abdomen Pelvis Stone Protocol  -     ketorolac (TORADOL) injection 60 mg; Inject 60 mg into the shoulder, thigh, or buttocks 1 (One) Time.  -     Ambulatory Referral to Urology  -     Urine Culture (Clean Catch); Future  -     Urinalysis (clean catch)    Renal calculi  -     ketorolac (TORADOL) injection 60 mg; Inject 60 mg into the shoulder, thigh, or buttocks 1 (One) Time.  -     Ambulatory Referral to Urology    Constipation, unspecified constipation type    Other orders  -     ciprofloxacin (CIPRO) 500 MG tablet; Take 1 tablet by mouth 2 (Two) Times a Day for 7 days.  -     ketorolac (TORADOL) 10 MG tablet; Take 1 tablet by mouth Every 6 (Six) Hours As Needed for moderate pain (4-6).        Additional Notes/Instructions  Continue with otc MOM for constipation  Increase PO intake water    Advised patient to not take any other NSAID mediations while taking Toradol: (IBU, aleve, Celebrex, Excedrin, etc)    The patient was advised that NSAID-type medications have two very important potential side effects: gastrointestinal irritation including hemorrhage and renal injuries. She was asked to take the medication with food and to stop if she experiences any GI upset. I asked her to call for vomiting, abdominal pain or black/bloody  stools. The patient expresses understanding of these issues and questions were answered.    Advised patient to go to the ER if fever >101.5, confusion, syncope, gross hematuria, enuresis, chest pain, SOA, or lethargy develop.     Follow-Up  Return in about 1 week (around 2/6/2017), or if symptoms worsen or fail to improve.    Patient/caregiver verbalizes understanding of all orders and instructions in this plan of care.

## 2017-02-01 LAB
BACTERIA SPEC AEROBE CULT: NORMAL
EBV EA IGG SER-ACNC: 28.4 U/ML (ref 0–8.9)
EBV NA IGG SER IA-ACNC: 481 U/ML (ref 0–17.9)
EBV VCA IGG SER-ACNC: >600 U/ML (ref 0–17.9)
EBV VCA IGM SER-ACNC: <36 U/ML (ref 0–35.9)
INTERPRETATION: ABNORMAL

## 2017-02-07 ENCOUNTER — OFFICE VISIT (OUTPATIENT)
Dept: FAMILY MEDICINE CLINIC | Facility: CLINIC | Age: 37
End: 2017-02-07

## 2017-02-07 VITALS
HEART RATE: 90 BPM | RESPIRATION RATE: 16 BRPM | TEMPERATURE: 98.1 F | DIASTOLIC BLOOD PRESSURE: 78 MMHG | SYSTOLIC BLOOD PRESSURE: 122 MMHG | WEIGHT: 179 LBS | BODY MASS INDEX: 35.14 KG/M2 | HEIGHT: 60 IN | OXYGEN SATURATION: 97 %

## 2017-02-07 DIAGNOSIS — IMO0001 ELEVATED BLOOD PRESSURE: ICD-10-CM

## 2017-02-07 DIAGNOSIS — R07.9 CHEST PAIN, UNSPECIFIED TYPE: Primary | ICD-10-CM

## 2017-02-07 PROBLEM — R09.1 PLEURISY: Status: ACTIVE | Noted: 2017-02-07

## 2017-02-07 PROBLEM — L28.2 PRURITIC RASH: Status: ACTIVE | Noted: 2017-02-07

## 2017-02-07 PROBLEM — R07.81 PLEURODYNIA: Status: ACTIVE | Noted: 2017-02-07

## 2017-02-07 PROBLEM — A69.20 ACUTE LYME DISEASE: Status: ACTIVE | Noted: 2017-02-07

## 2017-02-07 PROBLEM — A77.0 ROCKY MOUNTAIN SPOTTED FEVER: Status: ACTIVE | Noted: 2017-02-07

## 2017-02-07 PROBLEM — I10 HYPERTENSION: Status: ACTIVE | Noted: 2017-02-07

## 2017-02-07 PROBLEM — R10.9 RIGHT FLANK PAIN: Status: ACTIVE | Noted: 2017-02-07

## 2017-02-07 PROBLEM — F41.1 ANXIETY NEUROSIS: Status: ACTIVE | Noted: 2017-02-07

## 2017-02-07 PROCEDURE — 99215 OFFICE O/P EST HI 40 MIN: CPT | Performed by: FAMILY MEDICINE

## 2017-02-07 NOTE — PROGRESS NOTES
Subjective   Narcisa Frances Maurice is a 36 y.o. female.   Chief Complaint   Patient presents with   • Chest Pain     f/u [facility] er        Chest Pain    This is a new problem. The current episode started in the past 7 days. The onset quality is sudden. The problem occurs intermittently. The problem has been waxing and waning. The pain is present in the substernal region. The pain is at a severity of 5/10. The pain is moderate. The quality of the pain is described as crushing. The pain radiates to the mid back. Associated symptoms include back pain, headaches, irregular heartbeat, malaise/fatigue, near-syncope, palpitations and shortness of breath. Pertinent negatives include no cough, fever, lower extremity edema, nausea or orthopnea. The pain is aggravated by emotional upset and exertion. She has tried nothing for the symptoms. Risk factors include obesity and stress.   Her past medical history is significant for anxiety/panic attacks.   Her family medical history is significant for CAD, hyperlipidemia and hypertension.   Pertinent negatives for family medical history include: no diabetes. Prior diagnostic workup includes chest x-ray.      Patient was seen in emergency department and CT of the head, MRI brain were normal.  EKG and troponin were negative.  Hospital records reviewed  The following portions of the patient's history were reviewed and updated as appropriate: allergies, current medications, past family history, past medical history, past social history, past surgical history and problem list.    Review of Systems   Constitutional: Positive for malaise/fatigue. Negative for fever.   Eyes: Negative.    Respiratory: Positive for shortness of breath. Negative for cough.    Cardiovascular: Positive for chest pain, palpitations and near-syncope. Negative for orthopnea.   Gastrointestinal: Negative.  Negative for nausea.   Endocrine: Negative.    Genitourinary: Negative.    Musculoskeletal: Positive for  arthralgias and back pain.   Skin: Negative.    Allergic/Immunologic: Negative.    Neurological: Positive for headaches.   Hematological: Negative.    Psychiatric/Behavioral: Negative.    All other systems reviewed and are negative.      Objective   Physical Exam   Constitutional: She is oriented to person, place, and time. She appears well-developed and well-nourished.   HENT:   Head: Normocephalic and atraumatic.   Right Ear: External ear normal.   Left Ear: External ear normal.   Nose: Nose normal.   Mouth/Throat: Oropharynx is clear and moist.   Eyes: Conjunctivae and EOM are normal. Pupils are equal, round, and reactive to light.   Neck: Normal range of motion. Neck supple.   Cardiovascular: Normal rate, regular rhythm, normal heart sounds and intact distal pulses.  Exam reveals no gallop and no friction rub.    No murmur heard.  Pulmonary/Chest: Effort normal and breath sounds normal. She has no wheezes. She has no rales.   Abdominal: Soft. Bowel sounds are normal. She exhibits no mass. There is no tenderness. There is no rebound and no guarding.   Musculoskeletal: Normal range of motion.   Neurological: She is alert and oriented to person, place, and time. She has normal reflexes. No cranial nerve deficit. She exhibits normal muscle tone.   Skin: Skin is warm and dry. No rash noted.   Psychiatric: She has a normal mood and affect. Her behavior is normal. Judgment and thought content normal.   Nursing note and vitals reviewed.      Assessment/Plan   Narcisa was seen today for chest pain.    Diagnoses and all orders for this visit:    Chest pain, unspecified type  -     Holter Monitor - 24 Hour; Future  -     Adult Transthoracic Echo Complete; Future    Elevated blood pressure      Continue current therapies.  Continue to monitor and log blood pressures outpatient

## 2017-03-03 ENCOUNTER — OFFICE VISIT (OUTPATIENT)
Dept: FAMILY MEDICINE CLINIC | Facility: CLINIC | Age: 37
End: 2017-03-03

## 2017-03-03 VITALS
SYSTOLIC BLOOD PRESSURE: 132 MMHG | OXYGEN SATURATION: 97 % | BODY MASS INDEX: 34.75 KG/M2 | WEIGHT: 177 LBS | HEIGHT: 60 IN | DIASTOLIC BLOOD PRESSURE: 82 MMHG | TEMPERATURE: 97.7 F | HEART RATE: 91 BPM

## 2017-03-03 DIAGNOSIS — R10.9 ABDOMINAL PAIN, UNSPECIFIED LOCATION: Primary | ICD-10-CM

## 2017-03-03 PROCEDURE — 99214 OFFICE O/P EST MOD 30 MIN: CPT | Performed by: FAMILY MEDICINE

## 2017-03-03 NOTE — PROGRESS NOTES
Subjective   Narcisa Maurice is a 36 y.o. female.   Chief Complaint   Patient presents with   • Flank Pain     f/u from urology       Flank Pain   This is a chronic problem. The current episode started more than 1 month ago. The problem occurs daily. The problem has been waxing and waning since onset. The pain is present in the lumbar spine and thoracic spine. The quality of the pain is described as aching. The pain is at a severity of 6/10. The pain is moderate. The pain is worse during the day. Exacerbated by: eating. Associated symptoms include abdominal pain.        The following portions of the patient's history were reviewed and updated as appropriate: allergies, current medications, past family history, past medical history, past social history, past surgical history and problem list.    Review of Systems   Constitutional: Positive for fatigue.   HENT: Negative.    Eyes: Negative.    Respiratory: Negative.    Cardiovascular: Negative.    Gastrointestinal: Positive for abdominal pain and nausea.   Endocrine: Negative.    Genitourinary: Positive for flank pain.   Skin: Negative.    Allergic/Immunologic: Negative.    Neurological: Negative.    Hematological: Negative.    Psychiatric/Behavioral: Negative.    All other systems reviewed and are negative.      Objective   Physical Exam   Constitutional: She is oriented to person, place, and time. She appears well-developed and well-nourished.   HENT:   Head: Normocephalic and atraumatic.   Right Ear: External ear normal.   Left Ear: External ear normal.   Nose: Nose normal.   Mouth/Throat: Oropharynx is clear and moist.   Eyes: Conjunctivae and EOM are normal. Pupils are equal, round, and reactive to light.   Neck: Normal range of motion. Neck supple.   Cardiovascular: Normal rate, regular rhythm, normal heart sounds and intact distal pulses.  Exam reveals no gallop and no friction rub.    No murmur heard.  Pulmonary/Chest: Effort normal and breath sounds  normal. She has no wheezes. She has no rales.   Abdominal: Soft. Bowel sounds are normal. She exhibits no mass. There is no tenderness. There is no rebound and no guarding.   Musculoskeletal: Normal range of motion.   Neurological: She is alert and oriented to person, place, and time. She has normal reflexes. No cranial nerve deficit. She exhibits normal muscle tone.   Skin: Skin is warm and dry. No rash noted.   Psychiatric: She has a normal mood and affect. Her behavior is normal. Judgment and thought content normal.   Nursing note and vitals reviewed.  Labs and CT reviewed    Assessment/Plan   Narcisa was seen today for flank pain.    Diagnoses and all orders for this visit:    Abdominal pain, unspecified location  -     Ambulatory Referral to Gastroenterology

## 2017-03-16 ENCOUNTER — APPOINTMENT (OUTPATIENT)
Dept: LAB | Facility: HOSPITAL | Age: 37
End: 2017-03-16

## 2017-03-16 ENCOUNTER — OFFICE VISIT (OUTPATIENT)
Dept: GASTROENTEROLOGY | Facility: CLINIC | Age: 37
End: 2017-03-16

## 2017-03-16 VITALS
BODY MASS INDEX: 35.28 KG/M2 | WEIGHT: 179.7 LBS | DIASTOLIC BLOOD PRESSURE: 88 MMHG | SYSTOLIC BLOOD PRESSURE: 138 MMHG | HEIGHT: 60 IN | HEART RATE: 80 BPM

## 2017-03-16 DIAGNOSIS — R53.81 MALAISE AND FATIGUE: ICD-10-CM

## 2017-03-16 DIAGNOSIS — R11.2 NON-INTRACTABLE VOMITING WITH NAUSEA, UNSPECIFIED VOMITING TYPE: ICD-10-CM

## 2017-03-16 DIAGNOSIS — R53.83 MALAISE AND FATIGUE: ICD-10-CM

## 2017-03-16 DIAGNOSIS — R10.31 RIGHT LOWER QUADRANT ABDOMINAL PAIN: ICD-10-CM

## 2017-03-16 DIAGNOSIS — R10.13 EPIGASTRIC PAIN: Primary | ICD-10-CM

## 2017-03-16 DIAGNOSIS — K59.00 CONSTIPATION, UNSPECIFIED CONSTIPATION TYPE: ICD-10-CM

## 2017-03-16 LAB
CRP SERPL-MCNC: <0.5 MG/DL (ref 0–1)
ERYTHROCYTE [SEDIMENTATION RATE] IN BLOOD: 5 MM/HR (ref 0–20)

## 2017-03-16 PROCEDURE — 83516 IMMUNOASSAY NONANTIBODY: CPT | Performed by: PHYSICIAN ASSISTANT

## 2017-03-16 PROCEDURE — 80074 ACUTE HEPATITIS PANEL: CPT | Performed by: PHYSICIAN ASSISTANT

## 2017-03-16 PROCEDURE — 86003 ALLG SPEC IGE CRUDE XTRC EA: CPT | Performed by: PHYSICIAN ASSISTANT

## 2017-03-16 PROCEDURE — 82164 ANGIOTENSIN I ENZYME TEST: CPT | Performed by: PHYSICIAN ASSISTANT

## 2017-03-16 PROCEDURE — 86038 ANTINUCLEAR ANTIBODIES: CPT | Performed by: PHYSICIAN ASSISTANT

## 2017-03-16 PROCEDURE — 36415 COLL VENOUS BLD VENIPUNCTURE: CPT | Performed by: PHYSICIAN ASSISTANT

## 2017-03-16 PROCEDURE — 99244 OFF/OP CNSLTJ NEW/EST MOD 40: CPT | Performed by: PHYSICIAN ASSISTANT

## 2017-03-16 PROCEDURE — 85651 RBC SED RATE NONAUTOMATED: CPT | Performed by: PHYSICIAN ASSISTANT

## 2017-03-16 PROCEDURE — 86140 C-REACTIVE PROTEIN: CPT | Performed by: PHYSICIAN ASSISTANT

## 2017-03-16 RX ORDER — BUPROPION HYDROCHLORIDE 150 MG/1
150 TABLET ORAL
COMMUNITY
End: 2017-04-18

## 2017-03-16 RX ORDER — DEXTROSE AND SODIUM CHLORIDE 5; .45 G/100ML; G/100ML
30 INJECTION, SOLUTION INTRAVENOUS CONTINUOUS PRN
Status: CANCELLED | OUTPATIENT
Start: 2017-03-16

## 2017-03-16 RX ORDER — SODIUM CHLORIDE 0.9 % (FLUSH) 0.9 %
1-10 SYRINGE (ML) INJECTION AS NEEDED
Status: CANCELLED | OUTPATIENT
Start: 2017-03-16

## 2017-03-16 NOTE — PROGRESS NOTES
Chief Complaint   Patient presents with   • Difficulty Swallowing     Ref. Dr. Toño Arevalo       ENDO PROCEDURE ORDERED: EGD with bx poss dil    Subjective    Narcisa Maurice is a 37 y.o. female. she is being seen for consultation today at the request of Dr. Arevalo.    History of Present Illness    This 37-year-old  was sent for consultation for dysphagia and abdominal pain by Dr. Arevalo who saw the patient for flank pain on 3/3/17.  He saw her with complaints of chest pain onto/7/17.  Patient had previously seen Dr. Akhtar 10/14/14 for colitis, colonoscopy on 11/17/14 showed hemorrhoids with biopsy negative from the terminal ileum and random colon.  Recently she's had mid abdominal pain and chest pain at times is quite severe.  She will have right upper quadrant pain radiating into her back.  She complains of 8 out of 10 pain today.  She is not really had much heartburn, she has frequent vomiting.  She denied dysphagia.  She complains of abdominal swelling.  She feels very full in the upper abdomen.  If she eats her face and neck will swell, she gets shortness of breath.  She states she hurts all day, every day.  She states she does not want to eat because of this pain.  She has to sleep propped up in the bed, but still has shortness of breath.  She did have a positive IgM for [place] spotted fever on 5/17/16.  She is had diffuse symptoms prior to and since that time, and states she's been given for rounds of doxycycline.  All of her most recent testing all shows old infection.  She was apparently diagnosed with Lyme disease but I could find no testing for this.  Patient states she's been constipated in the past, she's been told she has IBS.  Typically she would have 2 bowel movements per day, she has had increasing difficulty now.  She states she has to take milk of magnesia in order to have a bowel movement.  Her weight is up 40.7 pounds since she last saw Dr. Akhtar.  She is  taking probiotic gummies.     Laboratory on 17 showed old CMV, EBV.  CMP showed glucose 123, BUN 26, otherwise normal.  CBC showed white count 10.6.  Urinalysis showed trace abnormalities.  She had a normal CMP on 17.  Laboratories on 17 urine culture was negative, urinalysis showed trace abnormalities.  EBV was old.  CMP showed total bilirubin 1.2, otherwise normal.    Studies on 17 KUB was negative.  CT scan abdomen pelvis with stone protocol showed postcholecystectomy, appendectomy.  Epigastric ventral hernia with fat, tubal ligation.  I reviewed her CT images, note that much of her small bowel was in the left upper quadrant.  I did not see any specific abnormality.  She had a subsequent normal spleen ultrasound .    Patient currently denies tobacco, alcohol, illicit substance use.  She was a previous pack-a-day week smoker.  She's had cholecystectomy appendectomy, , tubal ligation, history of colitis.  Family history of ulcer, cancer, stroke, gallstones, hypertension, allergy.  Father age 52 mother age 53 good health.  She is unmarried.  2 brothers in good health, 2 children in good health.    A/P: Nausea, vomiting, abdominal pain, change in bowel habits, significant weight gain, consider sarcoidosis, consider celiac disease.  Recommend further laboratories to evaluate.  Recommend EGD with possible dilatation as well as biopsies.  Consider small bowel evaluation, may need to consider repeat colonoscopy.  At this time recommend she continue with probiotics.  We'll see her in follow-up after the above, further pending clinical course and the results of the above.    Thank you Dr. Arevalo for this consultation and for allowing us to participate in care of your patient.  We'll keep you informed.     The following portions of the patient's history were reviewed and updated as appropriate:   Past Medical History   Diagnosis Date   • Anxiety state    • Blood in stool    • Cervical  disc disorder    • Cervical radiculopathy    • Colitis    • Costal chondritis    • Dental abscess    • Depressive disorder    • Elevated blood pressure    • Gastroenteritis    • Hip pain    • Joint pain    • Kidney stone    • Lumbar disc disorder with myelopathy    • Lyme disease, unspecified    • Neck pain    • Other neurologic disorders in Lyme disease    • Paresthesia    • Rash      C/O: a rash      • [place] spotted fever    • Shoulder pain    • Symptoms involving digestive system      Other symptoms involving digestive system      • Tick bite    • Vomiting and diarrhea      Past Surgical History   Procedure Laterality Date   • Injection of medication  2015     Depo Medrol (Methylprednisone) 80mg    • Procedure generic converted  2015     Drain/Inject Major Joint    •  section     • Cholecystectomy     • Tubal abdominal ligation     • Appendectomy     • Colonoscopy  2014     Family History   Problem Relation Age of Onset   • Thyroid disease Mother    • Heart disease Brother    • Cancer Other    • Ulcers Other    • Stroke Other    • Cholelithiasis Other    • Hypertension Other      OB History     No data available        Allergies   Allergen Reactions   • Bacitracin Photosensitivity   • Gramicidin Photosensitivity   • Neomycin-Bacitracin Zn-Polymyx Rash   • Neosporin Plus Pain Relief Ms [Neomycin-Polymyxin-Pramoxine] Rash     Social History     Social History   • Marital status: Single     Spouse name: N/A   • Number of children: 2   • Years of education: N/A     Social History Main Topics   • Smoking status: Never Smoker   • Smokeless tobacco: Never Used   • Alcohol use No   • Drug use: No   • Sexual activity: Yes     Partners: Male     Other Topics Concern   • None     Social History Narrative       Current Outpatient Prescriptions:   •  buPROPion XL (WELLBUTRIN XL) 150 MG 24 hr tablet, Take 150 mg by mouth Every Other Day., Disp: , Rfl:   •  fexofenadine (ALLEGRA) 180 MG  "tablet, Take as directed, Disp: , Rfl:   Review of Systems  Review of Systems   Constitutional: Positive for unexpected weight change.   Gastrointestinal: Positive for abdominal distention, abdominal pain, nausea and vomiting.   All other systems reviewed and are negative.         Objective      Visit Vitals   • /88 (BP Location: Left arm)   • Pulse 80   • Ht 60\" (152.4 cm)   • Wt 179 lb 11.2 oz (81.5 kg)   • LMP 03/01/2017   • BMI 35.1 kg/m2     Physical Exam   Constitutional: She is oriented to person, place, and time. She appears well-developed and well-nourished. No distress.   HENT:   Head: Normocephalic and atraumatic.   Eyes: EOM are normal. Pupils are equal, round, and reactive to light.   Neck: Normal range of motion.   Cardiovascular: Normal rate, regular rhythm and normal heart sounds.    Pulmonary/Chest: Effort normal and breath sounds normal.   Abdominal: Soft. Bowel sounds are normal. She exhibits no shifting dullness, no distension, no abdominal bruit, no ascites and no mass. There is no hepatosplenomegaly. There is tenderness. There is no rigidity, no rebound, no guarding and no CVA tenderness. No hernia. Hernia confirmed negative in the ventral area.   Obese, mild diffuse   Musculoskeletal: Normal range of motion.   Neurological: She is alert and oriented to person, place, and time.   Skin: Skin is warm and dry.   Psychiatric: She has a normal mood and affect. Her behavior is normal. Judgment and thought content normal.   Nursing note and vitals reviewed.    Lab on 01/30/2017   Component Date Value Ref Range Status   • Urine Culture 01/30/2017 No growth at 24 hours   Final     Assessment/Plan      1. Epigastric pain    2. Right lower quadrant abdominal pain    3. Non-intractable vomiting with nausea, unspecified vomiting type    4. Constipation, unspecified constipation type    5. Malaise and fatigue    .   Narcisa was seen today for difficulty swallowing.    Diagnoses and all orders for this " visit:    Epigastric pain  -     Sedimentation Rate  -     C-reactive Protein  -     Recurrent Gastrointestinal Distress  -     Nuclear Antigen Antibody, IFA  -     Anti-Smooth Muscle Antibody Titer  -     Mitochondrial Antibodies, M2  -     Hepatitis Panel, Acute  -     Angiotensin Converting Enzyme  -     Case Request; Standing  -     Case Request  -     Allergens (12) Foods  -     Glia(IgA / G) & TTG(IgA / G)  -     Extra Tubes    Right lower quadrant abdominal pain  -     Sedimentation Rate  -     C-reactive Protein  -     Recurrent Gastrointestinal Distress  -     Nuclear Antigen Antibody, IFA  -     Anti-Smooth Muscle Antibody Titer  -     Mitochondrial Antibodies, M2  -     Hepatitis Panel, Acute  -     Angiotensin Converting Enzyme  -     Allergens (12) Foods  -     Glia(IgA / G) & TTG(IgA / G)  -     Extra Tubes    Non-intractable vomiting with nausea, unspecified vomiting type  -     Sedimentation Rate  -     C-reactive Protein  -     Recurrent Gastrointestinal Distress  -     Nuclear Antigen Antibody, IFA  -     Anti-Smooth Muscle Antibody Titer  -     Mitochondrial Antibodies, M2  -     Hepatitis Panel, Acute  -     Angiotensin Converting Enzyme  -     Case Request; Standing  -     sodium chloride 0.9 % flush 1-10 mL; Infuse 1-10 mL into a venous catheter As Needed for Line Care.  -     dextrose 5 % and sodium chloride 0.45 % infusion; Infuse 30 mL/hr into a venous catheter Continuous As Needed (Start Prior to Procedure).  -     Case Request  -     Allergens (12) Foods  -     Glia(IgA / G) & TTG(IgA / G)  -     Extra Tubes    Constipation, unspecified constipation type  -     Sedimentation Rate  -     C-reactive Protein  -     Recurrent Gastrointestinal Distress  -     Nuclear Antigen Antibody, IFA  -     Anti-Smooth Muscle Antibody Titer  -     Mitochondrial Antibodies, M2  -     Hepatitis Panel, Acute  -     Angiotensin Converting Enzyme  -     Allergens (12) Foods  -     Glia(IgA / G) & TTG(IgA /  G)  -     Extra Tubes    Malaise and fatigue  -     Sedimentation Rate  -     C-reactive Protein  -     Recurrent Gastrointestinal Distress  -     Nuclear Antigen Antibody, IFA  -     Anti-Smooth Muscle Antibody Titer  -     Mitochondrial Antibodies, M2  -     Hepatitis Panel, Acute  -     Angiotensin Converting Enzyme  -     Allergens (12) Foods  -     Glia(IgA / G) & TTG(IgA / G)  -     Extra Tubes    Other orders  -     Obtain Informed Consent; Standing  -     Verify Informed Consent; Standing  -     POC Glucose Fingerstick; Standing  -     Insert Peripheral IV; Standing  -     Saline Lock & Maintain IV Access; Standing        Orders placed during this encounter include:  Orders Placed This Encounter   Procedures   • Sedimentation Rate   • C-reactive Protein   • Recurrent Gastrointestinal Distress   • Nuclear Antigen Antibody, IFA   • Anti-Smooth Muscle Antibody Titer   • Mitochondrial Antibodies, M2   • Hepatitis Panel, Acute   • Angiotensin Converting Enzyme   • Allergens (12) Foods   • Glia(IgA / G) & TTG(IgA / G)       Medications prescribed:  No orders of the defined types were placed in this encounter.    Discontinued Medications       Reason for Discontinue    buPROPion XL (WELLBUTRIN XL) 300 MG 24 hr tablet Dose adjustment        Requested Prescriptions      No prescriptions requested or ordered in this encounter       Review and/or summary of lab tests, radiology, procedures, medications. Review and summary of old records and obtaining of history. The risks and benefits of my recommendations, as well as other treatment options were discussed with the patient today. Questions were answered.    Follow-up: Return if symptoms worsen or fail to improve, for After the above.     ESOPHAGOGASTRODUODENOSCOPY WITH DILATATION and Biopsies (N/A)      This document has been electronically signed by Woody Noel PA-C on March 19, 2017 12:15 PM      Results for orders placed or performed in visit on 03/16/17    Glia(IgA / G) & TTG(IgA / G)   Result Value Ref Range    Gliadin Deamidated Peptide Ab, IgA 5 0 - 19 units    Deaminated Gliadin Ab IgG 3 0 - 19 units    Tissue Transglutaminase IgA <2 0 - 3 U/mL    Tissue Transglutaminase IgG <2 0 - 5 U/mL   Nuclear Antigen Antibody, IFA   Result Value Ref Range    JORDY Negative    Mitochondrial Antibodies, M2   Result Value Ref Range    Mitochondrial Ab <20.0 0.0 - 20.0 Units   Hepatitis Panel, Acute   Result Value Ref Range    Hepatitis C Ab Negative Negative    Hep A IgM Negative Negative    Hep B C IgM Negative Negative    Hepatitis B Surface Ag Negative Negative   Anti-Smooth Muscle Antibody Titer   Result Value Ref Range    Smooth Muscle Ab 11 0 - 19 Units   Sedimentation Rate   Result Value Ref Range    Sed Rate 5 0 - 20 mm/hr   Angiotensin Converting Enzyme   Result Value Ref Range    Angiotensin Converting Enzyme 37 14 - 82 U/L   C-reactive Protein   Result Value Ref Range    C-Reactive Protein <0.50 0.00 - 1.00 mg/dL   Results for orders placed or performed in visit on 01/30/17   Urine Culture (Clean Catch)   Result Value Ref Range    Urine Culture No growth at 24 hours    Results for orders placed or performed in visit on 01/30/17   POC Urinalysis Dipstick, Automated   Result Value Ref Range    Color Dark Yellow Yellow, Straw, Dark Yellow, Erin    Clarity, UA Clear Clear    Glucose, UA Negative Negative, 1000 mg/dL (3+) mg/dL    Bilirubin Small (1+) (A) Negative    Ketones, UA Trace (A) Negative    Specific Gravity  1.030 1.005 - 1.030    Blood, UA Trace (A) Negative    pH, Urine 6.0 5.0 - 8.0    Protein, POC Trace (A) Negative mg/dL    Urobilinogen, UA Normal Normal    Leukocytes Negative Negative    Nitrite, UA Negative Negative   Results for orders placed or performed in visit on 01/20/17   Comprehensive Metabolic Panel   Result Value Ref Range    Glucose 91 70 - 100 mg/dL    BUN 23 8 - 25 mg/dL    Creatinine 1.20 0.40 - 1.30 mg/dL    Sodium 138 134 - 146 mmol/L     Potassium 4.2 3.4 - 5.4 mmol/L    Chloride 102 100 - 112 mmol/L    CO2 30.0 20.0 - 32.0 mmol/L    Calcium 9.2 8.4 - 10.8 mg/dL    Total Protein 7.7 6.7 - 8.2 g/dL    Albumin 4.40 3.20 - 5.50 g/dL    ALT (SGPT) 15 10 - 60 U/L    AST (SGOT) 18 10 - 60 U/L    Alkaline Phosphatase 70 15 - 121 U/L    Total Bilirubin 1.2 (H) 0.2 - 1.0 mg/dL    eGFR Non  Amer 51 (L) 64 - 149 mL/min/1.73    Globulin 3.3 2.5 - 4.6 gm/dL    A/G Ratio 1.3 1.0 - 3.0 g/dL    BUN/Creatinine Ratio 19.2 7.0 - 25.0    Anion Gap 6.0 5.0 - 15.0 mmol/L   Results for orders placed or performed in visit on 01/16/17   Comprehensive Metabolic Panel   Result Value Ref Range    Glucose 87 70.0 - 100.0 mg/dl    BUN 18 8.0 - 25.0 mg/dl    Creatinine 1.1 0.4 - 1.3 mg/dl    Sodium 140.0 134 - 146 mmol/L    Potassium 4.5 3.4 - 5.4 mmol/L    Chloride 106.0 100.0 - 112.0 mmol/L    CO2 27.0 20.0 - 32.0 mmol/L    Calcium 9.3 8.4 - 10.8 mg/dl    Total Protein 7.2 6.7 - 8.2 gm/dl    Albumin 4.0 3.2 - 5.5 gm/dl    Total Bilirubin 0.8 0.2 - 1.0 mg/dl    Alkaline Phosphatase 69 15 - 121 U/L    ALT (SGPT) 21 10 - 60 U/L    AST (SGOT) 20 10 - 60 U/L    GFR MDRD Non  57 (L) 64 - 149 mL/min/1.73 sq.M    GFR MDRD  69 64 - 149 mL/min/1.73 sq.M    Anion Gap 7.0 5.0 - 15.0 mmol/L   Results for orders placed or performed during the hospital encounter of 01/13/17   EBV, Chrnic / Active Infection   Result Value Ref Range    EBV VCA IgM <36.0 0.0 - 35.9 U/mL    EBV Early Antigen Ab, IgG 24.9 (H) 0.0 - 8.9 U/mL    EBV VCA IgG >600.0 (H) 0.0 - 17.9 U/mL    EBV Nuclear Antigen Ab, IgG 426.0 (H) 0.0 - 17.9 U/mL    Interpretation Comment    Results for orders placed or performed in visit on 01/13/17   POC Urinalysis Dipstick, Automated   Result Value Ref Range    Color Yellow Yellow, Straw, Dark Yellow, Erin    Clarity, UA Clear Clear    Glucose, UA Negative Negative, 1000 mg/dL (3+) mg/dL    Bilirubin Negative Negative    Ketones, UA Negative  Negative    Specific Gravity  1.015 1.005 - 1.030    Blood, UA Trace (A) Negative    pH, Urine 5.0 5.0 - 8.0    Protein, POC Negative Negative mg/dL    Urobilinogen, UA Normal Normal    Leukocytes Negative Negative    Nitrite, UA Negative Negative   Hortencia-Barr Virus VCA Antibody Panel   Result Value Ref Range    EBV VCA IgM <36.0 0.0 - 35.9 U/mL    EBV Early Antigen Ab, IgG 28.4 (H) 0.0 - 8.9 U/mL    EBV VCA IgG >600.0 (H) 0.0 - 17.9 U/mL    EBV Nuclear Antigen Ab, IgG 481.0 (H) 0.0 - 17.9 U/mL    Interpretation Comment    Cytomegalovirus Antibody, IgM   Result Value Ref Range    CMV IgM <30.0 0.0 - 29.9 AU/mL   Cytomegalovirus Antibody, IgG   Result Value Ref Range    CMV IgG 3.50 (H) 0.00 - 0.59 U/mL   CBC & Differential   Result Value Ref Range    WBC 10.6 (H) 3.2 - 9.8 x1000/uL    RBC 4.47 3.77 - 5.16 maverick/mm3    Hemoglobin 13.1 12.0 - 15.5 gm/dl    Hematocrit 39.9 35.0 - 45.0 %    MCV 89.3 80.0 - 98.0 fl    MCH 29.3 26.0 - 34.0 pg    MCHC 32.8 31.4 - 36.0 gm/dl    Platelets 364 150 - 450 x1000/mm3    RDW 14.2 11.5 - 14.5 %    MPV 10.0 8.0 - 12.0 fl    Neutrophil Rel % 56.5 37.0 - 80.0 %    Lymphocyte Rel % 31.1 10.0 - 50.0 %    Monocyte Rel % 9.1 0.0 - 12.0 %    Eosinophil Rel % 2.7 0.0 - 7.0 %    Basophil Rel % 0.6 0.0 - 2.0 %    nRBC 0     nRBC 0      *Note: Due to a large number of results and/or encounters for the requested time period, some results have not been displayed. A complete set of results can be found in Results Review.       Some portions of this note have been dictated using voice recognition software and may contain errors and/or omissions.

## 2017-03-17 LAB
ACE SERPL-CCNC: 37 U/L (ref 14–82)
ACTIN IGG SERPL-ACNC: 11 UNITS (ref 0–19)
ANA SER QL IA: NEGATIVE
DEPRECATED MITOCHONDRIA M2 IGG SER-ACNC: <20 UNITS (ref 0–20)
GLIADIN PEPTIDE IGA SER-ACNC: 5 UNITS (ref 0–19)
GLIADIN PEPTIDE IGG SER-ACNC: 3 UNITS (ref 0–19)
HAV IGM SERPL QL IA: NEGATIVE
HBV CORE IGM SERPL QL IA: NEGATIVE
HBV SURFACE AG SERPL QL IA: NEGATIVE
HCV AB SER DONR QL: NEGATIVE
TTG IGA SER-ACNC: <2 U/ML (ref 0–3)
TTG IGG SER-ACNC: <2 U/ML (ref 0–5)

## 2017-03-17 RX ORDER — DEXTROSE AND SODIUM CHLORIDE 5; .45 G/100ML; G/100ML
20 INJECTION, SOLUTION INTRAVENOUS CONTINUOUS
Status: CANCELLED | OUTPATIENT
Start: 2017-03-18

## 2017-03-20 ENCOUNTER — HOSPITAL ENCOUNTER (OUTPATIENT)
Facility: HOSPITAL | Age: 37
Setting detail: HOSPITAL OUTPATIENT SURGERY
Discharge: HOME | End: 2017-03-20
Attending: INTERNAL MEDICINE | Admitting: INTERNAL MEDICINE

## 2017-03-20 VITALS
HEART RATE: 77 BPM | TEMPERATURE: 98 F | SYSTOLIC BLOOD PRESSURE: 150 MMHG | HEIGHT: 64 IN | OXYGEN SATURATION: 100 % | WEIGHT: 180 LBS | DIASTOLIC BLOOD PRESSURE: 93 MMHG | RESPIRATION RATE: 18 BRPM | BODY MASS INDEX: 30.73 KG/M2

## 2017-03-20 LAB
CALIF WALNUT POLN IGE QN: <0.1 KU/L
CODFISH IGE QN: <0.1 KU/L
CONV CLASS DESCRIPTION: NORMAL
COW MILK IGE QN: <0.1 KU/L
EGG WHITE IGE QN: <0.1 KU/L
GLUTEN IGE QN: <0.1 KU/L
HAZELNUT IGE QN: <0.1 KU/L
PEANUT IGE QN: <0.1 KU/L
SCALLOP IGE QN: <0.1 KU/L
SESAME SEED IGE: <0.1 KU/L
SHRIMP IGE: <0.1 KU/L
SOYBEAN IGE QN: <0.1 KU/L
WHEAT IGE QN: <0.1 KU/L

## 2017-03-20 RX ORDER — DEXTROSE AND SODIUM CHLORIDE 5; .45 G/100ML; G/100ML
20 INJECTION, SOLUTION INTRAVENOUS CONTINUOUS
Status: DISCONTINUED | OUTPATIENT
Start: 2017-03-20 | End: 2017-03-20 | Stop reason: HOSPADM

## 2017-03-20 RX ADMIN — DEXTROSE AND SODIUM CHLORIDE 20 ML/HR: 5; 450 INJECTION, SOLUTION INTRAVENOUS at 15:18

## 2017-03-20 NOTE — NURSING NOTE
Pt relates she ingested food at 9 am. MD notified and new orders received to cancel. Reschedules pt for march 22 at 1130. Informed of date and npo at midnight. States understadning

## 2017-03-20 NOTE — INTERVAL H&P NOTE
H&P reviewed. The patient was examined and there are no changes to the H&P.    Risks and benefits discussed with patient patient understands these and would like to proceed with procedure.

## 2017-03-22 ENCOUNTER — HOSPITAL ENCOUNTER (OUTPATIENT)
Facility: HOSPITAL | Age: 37
Setting detail: HOSPITAL OUTPATIENT SURGERY
Discharge: HOME | End: 2017-03-22
Attending: INTERNAL MEDICINE | Admitting: INTERNAL MEDICINE

## 2017-03-22 ENCOUNTER — ANESTHESIA (OUTPATIENT)
Dept: GASTROENTEROLOGY | Facility: HOSPITAL | Age: 37
End: 2017-03-22

## 2017-03-22 ENCOUNTER — ANESTHESIA EVENT (OUTPATIENT)
Dept: GASTROENTEROLOGY | Facility: HOSPITAL | Age: 37
End: 2017-03-22

## 2017-03-22 VITALS
TEMPERATURE: 96.7 F | DIASTOLIC BLOOD PRESSURE: 77 MMHG | HEIGHT: 60 IN | HEART RATE: 83 BPM | RESPIRATION RATE: 20 BRPM | SYSTOLIC BLOOD PRESSURE: 114 MMHG | OXYGEN SATURATION: 100 % | BODY MASS INDEX: 34.63 KG/M2 | WEIGHT: 176.37 LBS

## 2017-03-22 DIAGNOSIS — R11.2 NON-INTRACTABLE VOMITING WITH NAUSEA, UNSPECIFIED VOMITING TYPE: ICD-10-CM

## 2017-03-22 DIAGNOSIS — R10.13 EPIGASTRIC PAIN: ICD-10-CM

## 2017-03-22 DIAGNOSIS — R11.2 NAUSEA & VOMITING: ICD-10-CM

## 2017-03-22 PROCEDURE — 25010000002 MIDAZOLAM PER 1 MG: Performed by: NURSE ANESTHETIST, CERTIFIED REGISTERED

## 2017-03-22 PROCEDURE — 88342 IMHCHEM/IMCYTCHM 1ST ANTB: CPT | Performed by: INTERNAL MEDICINE

## 2017-03-22 PROCEDURE — 88342 IMHCHEM/IMCYTCHM 1ST ANTB: CPT | Performed by: PATHOLOGY

## 2017-03-22 PROCEDURE — 88305 TISSUE EXAM BY PATHOLOGIST: CPT | Performed by: PATHOLOGY

## 2017-03-22 PROCEDURE — 43239 EGD BIOPSY SINGLE/MULTIPLE: CPT | Performed by: INTERNAL MEDICINE

## 2017-03-22 PROCEDURE — 88305 TISSUE EXAM BY PATHOLOGIST: CPT | Performed by: INTERNAL MEDICINE

## 2017-03-22 PROCEDURE — 25010000002 PROPOFOL 10 MG/ML EMULSION: Performed by: NURSE ANESTHETIST, CERTIFIED REGISTERED

## 2017-03-22 PROCEDURE — 43248 EGD GUIDE WIRE INSERTION: CPT | Performed by: INTERNAL MEDICINE

## 2017-03-22 RX ORDER — DEXAMETHASONE SODIUM PHOSPHATE 4 MG/ML
8 INJECTION, SOLUTION INTRA-ARTICULAR; INTRALESIONAL; INTRAMUSCULAR; INTRAVENOUS; SOFT TISSUE ONCE AS NEEDED
Status: DISCONTINUED | OUTPATIENT
Start: 2017-03-22 | End: 2017-03-22 | Stop reason: HOSPADM

## 2017-03-22 RX ORDER — LIDOCAINE HYDROCHLORIDE 10 MG/ML
INJECTION, SOLUTION INFILTRATION; PERINEURAL AS NEEDED
Status: DISCONTINUED | OUTPATIENT
Start: 2017-03-22 | End: 2017-03-22 | Stop reason: SURG

## 2017-03-22 RX ORDER — MIDAZOLAM HYDROCHLORIDE 1 MG/ML
INJECTION INTRAMUSCULAR; INTRAVENOUS AS NEEDED
Status: DISCONTINUED | OUTPATIENT
Start: 2017-03-22 | End: 2017-03-22 | Stop reason: SURG

## 2017-03-22 RX ORDER — PROPOFOL 10 MG/ML
VIAL (ML) INTRAVENOUS AS NEEDED
Status: DISCONTINUED | OUTPATIENT
Start: 2017-03-22 | End: 2017-03-22 | Stop reason: SURG

## 2017-03-22 RX ORDER — PROMETHAZINE HYDROCHLORIDE 25 MG/ML
12.5 INJECTION, SOLUTION INTRAMUSCULAR; INTRAVENOUS ONCE AS NEEDED
Status: DISCONTINUED | OUTPATIENT
Start: 2017-03-22 | End: 2017-03-22 | Stop reason: HOSPADM

## 2017-03-22 RX ORDER — ONDANSETRON 2 MG/ML
4 INJECTION INTRAMUSCULAR; INTRAVENOUS ONCE AS NEEDED
Status: DISCONTINUED | OUTPATIENT
Start: 2017-03-22 | End: 2017-03-22 | Stop reason: HOSPADM

## 2017-03-22 RX ORDER — PROMETHAZINE HYDROCHLORIDE 25 MG/1
25 SUPPOSITORY RECTAL ONCE AS NEEDED
Status: DISCONTINUED | OUTPATIENT
Start: 2017-03-22 | End: 2017-03-22 | Stop reason: HOSPADM

## 2017-03-22 RX ORDER — DEXTROSE AND SODIUM CHLORIDE 5; .45 G/100ML; G/100ML
20 INJECTION, SOLUTION INTRAVENOUS CONTINUOUS
Status: DISCONTINUED | OUTPATIENT
Start: 2017-03-22 | End: 2017-03-22 | Stop reason: HOSPADM

## 2017-03-22 RX ORDER — PROMETHAZINE HYDROCHLORIDE 25 MG/1
25 TABLET ORAL ONCE AS NEEDED
Status: DISCONTINUED | OUTPATIENT
Start: 2017-03-22 | End: 2017-03-22 | Stop reason: HOSPADM

## 2017-03-22 RX ADMIN — PROPOFOL 60 MG: 10 INJECTION, EMULSION INTRAVENOUS at 12:27

## 2017-03-22 RX ADMIN — MIDAZOLAM 2 MG: 1 INJECTION INTRAMUSCULAR; INTRAVENOUS at 12:23

## 2017-03-22 RX ADMIN — PROPOFOL 50 MG: 10 INJECTION, EMULSION INTRAVENOUS at 12:29

## 2017-03-22 RX ADMIN — LIDOCAINE HYDROCHLORIDE 100 MG: 10 INJECTION, SOLUTION INFILTRATION; PERINEURAL at 12:27

## 2017-03-22 NOTE — PLAN OF CARE
Problem: Patient Care Overview (Adult)  Goal: Plan of Care Review  Outcome: Outcome(s) achieved Date Met:  03/22/17 03/22/17 1254   Coping/Psychosocial Response Interventions   Plan Of Care Reviewed With patient   Patient Care Overview   Progress no change   Outcome Evaluation   Outcome Summary/Follow up Plan vss         03/22/17 1254   Coping/Psychosocial Response Interventions   Plan Of Care Reviewed With patient   Patient Care Overview   Progress no change   Outcome Evaluation   Outcome Summary/Follow up Plan vss         Problem: GI Endoscopy (Adult)  Goal: Signs and Symptoms of Listed Potential Problems Will be Absent or Manageable (GI Endoscopy)  Outcome: Outcome(s) achieved Date Met:  03/22/17 03/22/17 1254   GI Endoscopy   Problems Assessed (GI Endoscopy) all   Problems Present (GI Endoscopy) none

## 2017-03-22 NOTE — ANESTHESIA PREPROCEDURE EVALUATION
Anesthesia Evaluation     Patient summary reviewed and Nursing notes reviewed   NPO Status: > 8 hours   Airway   Mallampati: II  TM distance: <3 FB  Neck ROM: full  no difficulty expected  Dental    (+) lower dentures and upper dentures    Pulmonary - negative pulmonary ROS and normal exam   Cardiovascular - normal exam    (+) hypertension,       Neuro/Psych  (+) numbness, psychiatric history Anxiety,    GI/Hepatic/Renal/Endo    (+)  chronic renal disease stones,     Musculoskeletal     (+) neck pain,   Abdominal  - normal exam   Substance History      OB/GYN          Other                                  Anesthesia Plan    ASA 2     MAC

## 2017-03-22 NOTE — ANESTHESIA POSTPROCEDURE EVALUATION
Patient: Narcisa Maurice    Procedure Summary     Date Anesthesia Start Anesthesia Stop Room / Location    03/22/17 1225 1237 [facility] ENDOSCOPY 1 / [facility] ENDOSCOPY       Procedure Diagnosis Surgeon Provider    ESOPHAGOGASTRODUODENOSCOPY (N/A Esophagus) Epigastric pain; Nausea & vomiting  (Epigastric pain [R10.13]; Nausea & vomiting [R11.2]) MD Hamida Guardado CRNA          Anesthesia Type: MAC  Last vitals  /91 (03/22/17 1144)    Temp 97.9 °F (36.6 °C) (03/22/17 1144)    Pulse 84 (03/22/17 1144)   Resp 16 (03/22/17 1144)    SpO2 100 % (03/22/17 1144)      Post Anesthesia Care and Evaluation    Patient location during evaluation: bedside  Patient participation: complete - patient participated  Level of consciousness: awake  Pain score: 0  Pain management: adequate  Airway patency: patent  Anesthetic complications: No anesthetic complications  PONV Status: none  Cardiovascular status: acceptable  Respiratory status: acceptable  Hydration status: acceptable

## 2017-03-22 NOTE — PLAN OF CARE
Problem: Patient Care Overview (Adult)  Goal: Plan of Care Review  Outcome: Ongoing (interventions implemented as appropriate)    03/22/17 1238   Coping/Psychosocial Response Interventions   Plan Of Care Reviewed With patient   Patient Care Overview   Progress no change   Outcome Evaluation   Outcome Summary/Follow up Plan vss         Problem: GI Endoscopy (Adult)  Goal: Signs and Symptoms of Listed Potential Problems Will be Absent or Manageable (GI Endoscopy)  Outcome: Ongoing (interventions implemented as appropriate)    03/22/17 1238   GI Endoscopy   Problems Assessed (GI Endoscopy) all   Problems Present (GI Endoscopy) none

## 2017-03-22 NOTE — DISCHARGE INSTRUCTIONS
Dr. Alton Ross, DO  [address] [facility]  [place] [address]  [phone]    Appointment date and time:Outpatient Instructions for Monitored Anesthesia Care (MAC)    1. You will be released from the hospital in the care of a responsible adult who should remain with you for at least 6 hours.    2. You are at an increased risk for falls following anesthesia. Use care when changing from a lying to a sitting position. Use your assistive devices ( example: cane, walker or family member).    3. You must NOT drive a car, climb high places such as a ladder, or operate equipment such as electric knives,stoves etc...for at least 12 hours. If you are dizzy for longer than 24 hours, notify your doctor.    4. DO NOT drink any alcoholic beverages for at least 24 hours. Anesthesia may impair your judgement.    5. If you smoke, do not smoke alone due to increased risk of burns/fires.    6. DO NOT undertake any legally binding commitment for at least 24 hours. Anesthesia may impair your judgement.

## 2017-03-23 LAB
LAB AP CASE REPORT: NORMAL
LAB AP DIAGNOSIS COMMENT: NORMAL
Lab: NORMAL
PATH REPORT.FINAL DX SPEC: NORMAL
PATH REPORT.GROSS SPEC: NORMAL

## 2017-03-29 ENCOUNTER — OFFICE VISIT (OUTPATIENT)
Dept: GASTROENTEROLOGY | Facility: CLINIC | Age: 37
End: 2017-03-29

## 2017-03-29 VITALS
BODY MASS INDEX: 35.1 KG/M2 | HEIGHT: 60 IN | DIASTOLIC BLOOD PRESSURE: 83 MMHG | HEART RATE: 71 BPM | SYSTOLIC BLOOD PRESSURE: 127 MMHG | WEIGHT: 178.8 LBS

## 2017-03-29 DIAGNOSIS — R53.81 MALAISE AND FATIGUE: ICD-10-CM

## 2017-03-29 DIAGNOSIS — R11.2 NON-INTRACTABLE VOMITING WITH NAUSEA, UNSPECIFIED VOMITING TYPE: Primary | ICD-10-CM

## 2017-03-29 DIAGNOSIS — R53.83 MALAISE AND FATIGUE: ICD-10-CM

## 2017-03-29 DIAGNOSIS — K59.00 CONSTIPATION, UNSPECIFIED CONSTIPATION TYPE: ICD-10-CM

## 2017-03-29 DIAGNOSIS — R10.13 EPIGASTRIC PAIN: ICD-10-CM

## 2017-03-29 PROCEDURE — 99214 OFFICE O/P EST MOD 30 MIN: CPT | Performed by: PHYSICIAN ASSISTANT

## 2017-03-29 RX ORDER — PANTOPRAZOLE SODIUM 40 MG/1
40 TABLET, DELAYED RELEASE ORAL DAILY
Qty: 30 TABLET | Refills: 3 | Status: SHIPPED | OUTPATIENT
Start: 2017-03-29 | End: 2017-07-05 | Stop reason: SDUPTHER

## 2017-03-29 RX ORDER — LUBIPROSTONE 24 UG/1
24 CAPSULE ORAL 2 TIMES DAILY WITH MEALS
Qty: 60 CAPSULE | Refills: 3 | Status: SHIPPED | OUTPATIENT
Start: 2017-03-29 | End: 2017-11-16 | Stop reason: SDUPTHER

## 2017-03-31 ENCOUNTER — TELEPHONE (OUTPATIENT)
Dept: GASTROENTEROLOGY | Facility: CLINIC | Age: 37
End: 2017-03-31

## 2017-03-31 NOTE — TELEPHONE ENCOUNTER
Patient's Rx for Amitiza has been approved through her insurance company from 3/29/17 to 3/30/2018. The patient and pharmacy have both been made aware.

## 2017-04-18 ENCOUNTER — OFFICE VISIT (OUTPATIENT)
Dept: FAMILY MEDICINE CLINIC | Facility: CLINIC | Age: 37
End: 2017-04-18

## 2017-04-18 ENCOUNTER — TELEPHONE (OUTPATIENT)
Dept: FAMILY MEDICINE CLINIC | Facility: CLINIC | Age: 37
End: 2017-04-18

## 2017-04-18 VITALS
DIASTOLIC BLOOD PRESSURE: 80 MMHG | BODY MASS INDEX: 34.95 KG/M2 | SYSTOLIC BLOOD PRESSURE: 124 MMHG | HEART RATE: 83 BPM | TEMPERATURE: 98 F | OXYGEN SATURATION: 99 % | HEIGHT: 60 IN | WEIGHT: 178 LBS

## 2017-04-18 DIAGNOSIS — N39.0 URINARY TRACT INFECTION, SITE UNSPECIFIED: Primary | ICD-10-CM

## 2017-04-18 DIAGNOSIS — R30.0 DYSURIA: ICD-10-CM

## 2017-04-18 DIAGNOSIS — R82.90 FOUL SMELLING URINE: ICD-10-CM

## 2017-04-18 LAB
BILIRUB BLD-MCNC: NEGATIVE MG/DL
CLARITY, POC: ABNORMAL
COLOR UR: ABNORMAL
GLUCOSE UR STRIP-MCNC: NEGATIVE MG/DL
KETONES UR QL: NEGATIVE
LEUKOCYTE EST, POC: NEGATIVE
NITRITE UR-MCNC: NEGATIVE MG/ML
PH UR: 6 [PH] (ref 5–8)
PROT UR STRIP-MCNC: NEGATIVE MG/DL
RBC # UR STRIP: NEGATIVE /UL
SP GR UR: 1.02 (ref 1–1.03)
UROBILINOGEN UR QL: NORMAL

## 2017-04-18 PROCEDURE — 87086 URINE CULTURE/COLONY COUNT: CPT | Performed by: NURSE PRACTITIONER

## 2017-04-18 PROCEDURE — 99213 OFFICE O/P EST LOW 20 MIN: CPT | Performed by: NURSE PRACTITIONER

## 2017-04-18 PROCEDURE — 81003 URINALYSIS AUTO W/O SCOPE: CPT | Performed by: NURSE PRACTITIONER

## 2017-04-18 RX ORDER — NITROFURANTOIN 25; 75 MG/1; MG/1
100 CAPSULE ORAL 2 TIMES DAILY
Qty: 10 CAPSULE | Refills: 0 | Status: SHIPPED | OUTPATIENT
Start: 2017-04-18 | End: 2017-04-23

## 2017-04-18 RX ORDER — NITROFURANTOIN 25; 75 MG/1; MG/1
100 CAPSULE ORAL 2 TIMES DAILY
Qty: 10 CAPSULE | Refills: 0 | Status: SHIPPED | OUTPATIENT
Start: 2017-04-18 | End: 2017-04-18 | Stop reason: SDUPTHER

## 2017-04-18 NOTE — PROGRESS NOTES
"Chief Complaint   Patient presents with   • Urinary Tract Infection     urine has odor, doesnt feel like bladder is completley empty,. Did have kidney stones about a month ago.        Subjective   HPI   Narcisa Frances Maurice is a 37 y.o. female presents to the office for evaluation of suspected UTI. She states her urine has a \"rancid\" odor. She first noticed urinary odor 2-3 days ago. This is a recurrent problem. She was last treated for UTI on 1/30/2017 with cipro.She states this odor \"got better for a while and then it just came back\". She can not identify a specific smell associated with her urine. She denies it smelling like strong urine odor and denies a fishy odor.    She also c/o a \"film\" that she can see in the commode after she urinates. Her urine is also sometimes \"dark colored\". She does not notice any odor outside of urinating.   She denies dysuria, frequency, fever, flank pain, vaginal discharge, and new sexual partner.     Urinary Tract Infection    This is a new problem. The current episode started in the past 7 days. The problem occurs every urination. The problem has been unchanged. Quality: foul odor with every urination. The pain is at a severity of 0/10. The patient is experiencing no pain. There has been no fever. She is sexually active. Pertinent negatives include no chills, discharge, flank pain, hematuria, nausea or urgency. She has tried increased fluids for the symptoms. The treatment provided no relief. Her past medical history is significant for kidney stones.       Family History   Problem Relation Age of Onset   • Thyroid disease Mother    • Heart disease Brother    • Cancer Other    • Ulcers Other    • Stroke Other    • Cholelithiasis Other    • Hypertension Other      Social History     Social History   • Marital status: Single     Spouse name: N/A   • Number of children: 2   • Years of education: N/A     Occupational History   • Not on file.     Social History Main Topics   • " Smoking status: Never Smoker   • Smokeless tobacco: Never Used   • Alcohol use No   • Drug use: No   • Sexual activity: Yes     Partners: Male     Other Topics Concern   • Not on file     Social History Narrative       Review of Systems   Constitutional: Negative.  Negative for activity change, chills, fatigue, fever and unexpected weight change.   HENT: Negative.  Negative for congestion, ear pain, postnasal drip, rhinorrhea, sinus pressure and sore throat.    Eyes: Negative.  Negative for pain and redness.   Respiratory: Negative.  Negative for cough, choking, chest tightness, shortness of breath and wheezing.    Cardiovascular: Negative.  Negative for chest pain, palpitations and leg swelling.   Gastrointestinal: Negative.  Negative for abdominal distention, abdominal pain, constipation, diarrhea, nausea and rectal pain.   Endocrine: Negative.    Genitourinary: Negative.  Negative for decreased urine volume, difficulty urinating, dysuria, flank pain, hematuria, urgency and vaginal discharge.   Musculoskeletal: Negative.  Negative for gait problem and neck pain.   Skin: Negative.  Negative for rash and wound.   Allergic/Immunologic: Negative.    Neurological: Negative.  Negative for dizziness, syncope, weakness, light-headedness, numbness and headaches.   Hematological: Negative.    Psychiatric/Behavioral: Negative.  Negative for agitation, behavioral problems, confusion, self-injury, sleep disturbance and suicidal ideas. The patient is not nervous/anxious.      14 Point ROS completed with pertinent positives discussed. All other systems reviewed and are negative.     The following portions of the patient's history were reviewed and updated as appropriate: allergies, current medications, past family history, past medical history, past social history, past surgical history and problem list.    Encounter Vitals  Vitals:    04/18/17 1006   BP: 124/80   Pulse: 83   Temp: 98 °F (36.7 °C)   SpO2: 99%   Weight: 178 lb  "(80.7 kg)   Height: 60\" (152.4 cm)   PainSc: 0-No pain       Objective:  Physical Exam   Constitutional: She is oriented to person, place, and time. Vital signs are normal. She appears well-developed and well-nourished.   HENT:   Head: Normocephalic and atraumatic.   Right Ear: Tympanic membrane, external ear and ear canal normal.   Left Ear: Tympanic membrane, external ear and ear canal normal.   Nose: Nose normal.   Mouth/Throat: Uvula is midline, oropharynx is clear and moist and mucous membranes are normal. No posterior oropharyngeal edema or posterior oropharyngeal erythema.   Eyes: Lids are normal. Pupils are equal, round, and reactive to light.   Neck: Trachea normal and normal range of motion. Neck supple. No thyroid mass present.   Cardiovascular: Normal rate, regular rhythm, S1 normal, S2 normal, normal heart sounds and intact distal pulses.  Exam reveals no gallop and no friction rub.    No murmur heard.  Pulmonary/Chest: Effort normal and breath sounds normal. No respiratory distress. She has no wheezes. She has no rales.   Abdominal: Soft. Normal appearance and bowel sounds are normal. She exhibits no mass. There is no rebound and no guarding.   Musculoskeletal: Normal range of motion.   Lymphadenopathy:     She has no cervical adenopathy.   Neurological: She is alert and oriented to person, place, and time. She has normal strength. No cranial nerve deficit or sensory deficit. Gait normal.   Skin: Skin is warm and dry. No rash noted.   Psychiatric: She has a normal mood and affect. Her speech is normal and behavior is normal. Judgment and thought content normal. Cognition and memory are normal.   Nursing note and vitals reviewed.      Pertinent Labs  Brief Urine Lab Results  (Last result in the past 365 days)      Color   Clarity   Blood   Leuk Est   Nitrite   Protein   CREAT   Urine HCG        04/18/17 1023 Dark Yellow Cloudy(A) Negative Negative Negative Negative             Labs have been " independently reviewed    Key Imaging/Tracings/POC Testing    Assessment and Plan  Narcisa was seen today for urinary tract infection.    Diagnoses and all orders for this visit:    Urinary tract infection, site unspecified  -     POCT urinalysis dipstick, automated  -     Cancel: Urinalysis (clean catch)  -     Urine culture (clean catch)    Dysuria  -     Cancel: Urine culture (clean catch)  -     Cancel: Urinalysis (clean catch)    Foul smelling urine  -     Cancel: Urinalysis (clean catch)    Other orders  -     Discontinue: nitrofurantoin, macrocrystal-monohydrate, (MACROBID) 100 MG capsule; Take 1 capsule by mouth 2 (Two) Times a Day for 5 days.  -     nitrofurantoin, macrocrystal-monohydrate, (MACROBID) 100 MG capsule; Take 1 capsule by mouth 2 (Two) Times a Day for 5 days.      Side effects of ordered medications discussed with patient.     Additional Notes/Instructions  Start macrobid today  Schedule an appt with [facility] for gyn evaluation and further work-up for etiology associated with vaginal odor.  Will call with results from urine culture and change PO abx if needed    Follow-Up  Return if symptoms worsen or fail to improve.    Patient/caregiver verbalizes understanding of all orders and instructions in this plan of care.

## 2017-04-21 ENCOUNTER — OFFICE VISIT (OUTPATIENT)
Dept: OBSTETRICS AND GYNECOLOGY | Facility: CLINIC | Age: 37
End: 2017-04-21

## 2017-04-21 ENCOUNTER — TELEPHONE (OUTPATIENT)
Dept: FAMILY MEDICINE CLINIC | Facility: CLINIC | Age: 37
End: 2017-04-21

## 2017-04-21 VITALS
HEIGHT: 60 IN | SYSTOLIC BLOOD PRESSURE: 118 MMHG | RESPIRATION RATE: 18 BRPM | BODY MASS INDEX: 34.71 KG/M2 | WEIGHT: 176.8 LBS | HEART RATE: 81 BPM | DIASTOLIC BLOOD PRESSURE: 78 MMHG

## 2017-04-21 DIAGNOSIS — B96.89 BV (BACTERIAL VAGINOSIS): ICD-10-CM

## 2017-04-21 DIAGNOSIS — R35.0 URINARY FREQUENCY: ICD-10-CM

## 2017-04-21 DIAGNOSIS — R82.90 ABNORMAL URINE ODOR: Primary | ICD-10-CM

## 2017-04-21 DIAGNOSIS — N76.0 BV (BACTERIAL VAGINOSIS): ICD-10-CM

## 2017-04-21 LAB — BACTERIA SPEC AEROBE CULT: NORMAL

## 2017-04-21 PROCEDURE — 87210 SMEAR WET MOUNT SALINE/INK: CPT | Performed by: NURSE PRACTITIONER

## 2017-04-21 PROCEDURE — 99213 OFFICE O/P EST LOW 20 MIN: CPT | Performed by: NURSE PRACTITIONER

## 2017-04-21 RX ORDER — METRONIDAZOLE 500 MG/1
500 TABLET ORAL 2 TIMES DAILY
Qty: 14 TABLET | Refills: 0 | Status: SHIPPED | OUTPATIENT
Start: 2017-04-21 | End: 2017-04-28

## 2017-04-21 NOTE — TELEPHONE ENCOUNTER
----- Message from LUIS MIGUEL Hernandez sent at 4/21/2017  2:05 PM CDT -----  No growth. Can stop PO abx. Call and see how she is feeling. And if she has seen lona. Probably a BV infection.

## 2017-04-21 NOTE — PROGRESS NOTES
Subjective   Narcisa Frances Maurice is a 37 y.o. female.     History of Present Illness  Pt presents as referral from LUIS MIGUEL Schwartz for urinary frequency and strong foul odor of urine. Pt states she feels like she has difficulty emptying bladder. Urine varies in color from pale, clear yellow to strong, dark. Pt had kidney stones 1 month ago. Pt has noticed watery discharge since taking Macrobid. She had a bladder tack. Hx of BV and yeast infections. Pt states she is taking numerous supplements and changing diet to fruits and vegetables. She does not drink coffee but has daily green tea, uses stevia, takes B12, tumeric and oregano and eats asparagus 3 times weekly.     The following portions of the patient's history were reviewed and updated as appropriate: allergies, current medications, past family history, past medical history, past social history, past surgical history and problem list.    Review of Systems   Constitutional: Negative.  Negative for chills, fatigue and fever.   Respiratory: Negative.    Cardiovascular: Negative.    Genitourinary: Positive for decreased urine volume, difficulty urinating (difficulty emptying bladder), frequency and vaginal discharge. Negative for dysuria, flank pain, hematuria, pelvic pain, urgency and vaginal pain.        Hx of uterine fibroids, endometriosis   Musculoskeletal: Negative for back pain.       Objective   Physical Exam   Constitutional: She is oriented to person, place, and time. She appears well-developed and well-nourished.   Cardiovascular: Normal rate, regular rhythm and normal heart sounds.    Pulmonary/Chest: Effort normal and breath sounds normal.   Genitourinary: There is no rash, tenderness, lesion or injury on the right labia. There is no rash, tenderness, lesion or injury on the left labia. Uterus is enlarged (fibroid). Uterus is not deviated and not tender. Cervix exhibits no motion tenderness. Right adnexum displays no mass, no tenderness and no  "fullness. Left adnexum displays no mass, no tenderness and no fullness. No erythema or tenderness in the vagina. Vaginal discharge (moderate amount of thin white discharge, malodorus, wet prep obtained) found.   Genitourinary Comments: Urethra WNL. No pain with bladder palpation. No prolapses. Wet prep: positive for clue cell TNTC, no yeast buds, hyphae, or trichomonads. Evaluated by KENZIE Luna   Neurological: She is alert and oriented to person, place, and time.   Psychiatric: She has a normal mood and affect. Her behavior is normal.   Vitals reviewed.      Assessment/Plan   Narcisa was seen today for urinary frequency and urine has strong odor.    Diagnoses and all orders for this visit:    Abnormal urine odor    Urinary frequency    BV (bacterial vaginosis)  -     metroNIDAZOLE (FLAGYL) 500 MG tablet; Take 1 tablet by mouth 2 (Two) Times a Day for 7 days. No alcohol up to 48 hours after last dose       Discussed bladder irritants at length. Pt has numerous factors that potentially cause frequency and changes in odor. Recommend diet elimination and monitoring. RTC prn.     Pt also has BV on exam. Flagyl 500mg BID x 7 days. Discussed NO alcohol up to 48 hrs after last dose of Flagyl. Pt verbalizes understanding. Discussed vulvar hygiene at length. She recently changed to \"herbal\" soaps that include tea tree oil. Recommended dove only.            "

## 2017-06-22 PROCEDURE — 86665 EPSTEIN-BARR CAPSID VCA: CPT | Performed by: NURSE PRACTITIONER

## 2017-06-22 PROCEDURE — 86003 ALLG SPEC IGE CRUDE XTRC EA: CPT | Performed by: NURSE PRACTITIONER

## 2017-06-22 PROCEDURE — 86663 EPSTEIN-BARR ANTIBODY: CPT | Performed by: NURSE PRACTITIONER

## 2017-06-22 PROCEDURE — 86664 EPSTEIN-BARR NUCLEAR ANTIGEN: CPT | Performed by: NURSE PRACTITIONER

## 2017-06-22 PROCEDURE — 36415 COLL VENOUS BLD VENIPUNCTURE: CPT | Performed by: PHYSICIAN ASSISTANT

## 2017-06-22 PROCEDURE — 84443 ASSAY THYROID STIM HORMONE: CPT | Performed by: NURSE PRACTITIONER

## 2017-06-22 PROCEDURE — 84439 ASSAY OF FREE THYROXINE: CPT | Performed by: NURSE PRACTITIONER

## 2017-06-22 PROCEDURE — 86618 LYME DISEASE ANTIBODY: CPT | Performed by: NURSE PRACTITIONER

## 2017-07-05 RX ORDER — PANTOPRAZOLE SODIUM 40 MG/1
TABLET, DELAYED RELEASE ORAL
Qty: 30 TABLET | Refills: 0 | Status: SHIPPED | OUTPATIENT
Start: 2017-07-05 | End: 2017-11-16 | Stop reason: SDUPTHER

## 2017-08-07 ENCOUNTER — OFFICE VISIT (OUTPATIENT)
Dept: FAMILY MEDICINE CLINIC | Facility: CLINIC | Age: 37
End: 2017-08-07

## 2017-08-07 VITALS
DIASTOLIC BLOOD PRESSURE: 88 MMHG | HEIGHT: 60 IN | HEART RATE: 78 BPM | RESPIRATION RATE: 16 BRPM | BODY MASS INDEX: 31.61 KG/M2 | SYSTOLIC BLOOD PRESSURE: 134 MMHG | TEMPERATURE: 98.6 F | WEIGHT: 161 LBS | OXYGEN SATURATION: 99 %

## 2017-08-07 DIAGNOSIS — A77.0 ROCKY MOUNTAIN SPOTTED FEVER: ICD-10-CM

## 2017-08-07 DIAGNOSIS — B27.90 CHRONIC EBV INFECTION: ICD-10-CM

## 2017-08-07 DIAGNOSIS — M25.50 ARTHRALGIA, UNSPECIFIED JOINT: Primary | ICD-10-CM

## 2017-08-07 PROCEDURE — 99214 OFFICE O/P EST MOD 30 MIN: CPT | Performed by: FAMILY MEDICINE

## 2017-08-07 NOTE — PROGRESS NOTES
Libra Maurice is a 37 y.o. female.   Chief Complaint   Patient presents with   • Referral to Rheumatologist   • Referral to infectious disease       History of Present Illness   Patient presents today for reevaluation of arthralgias and fatigue.  Patient went to urgent care where she was evaluated and lab work was repeated.  Her EBV titers showed a chronic infection and she once again showed recurrence of [place] spotted fever.  Patient was treated with 28 days of doxycycline.  Since she has stopped the medication her fatigue has worsened with increase arthralgias especially in the neck shoulders lower back knees and elbows.  She is requesting referral to infectious disease and rheumatology.  Her urgent care notes have been reviewed.    The following portions of the patient's history were reviewed and updated as appropriate: allergies, current medications, past family history, past medical history, past social history, past surgical history and problem list.    Review of Systems   Constitutional: Positive for activity change and fatigue.   HENT: Positive for trouble swallowing.    Eyes: Negative.    Respiratory: Negative.    Cardiovascular: Negative.    Gastrointestinal: Negative.    Endocrine: Negative.    Genitourinary: Negative.    Musculoskeletal: Positive for arthralgias, back pain and myalgias.   Skin: Negative.    Allergic/Immunologic: Negative.    Neurological: Positive for headaches.   Hematological: Positive for adenopathy. Bruises/bleeds easily.   Psychiatric/Behavioral: Negative.    All other systems reviewed and are negative.      Objective   Physical Exam   Constitutional: She is oriented to person, place, and time. She appears well-developed and well-nourished.   HENT:   Head: Normocephalic and atraumatic.   Right Ear: External ear normal.   Left Ear: External ear normal.   Nose: Nose normal.   Mouth/Throat: Oropharynx is clear and moist.   Eyes: Conjunctivae and EOM are  normal. Pupils are equal, round, and reactive to light.   Neck: Normal range of motion. Neck supple.   Cardiovascular: Normal rate, regular rhythm, normal heart sounds and intact distal pulses.  Exam reveals no gallop and no friction rub.    No murmur heard.  Pulmonary/Chest: Effort normal and breath sounds normal. She has no wheezes. She has no rales.   Abdominal: Soft. Bowel sounds are normal. She exhibits no mass. There is no tenderness. There is no rebound and no guarding.   Musculoskeletal: Normal range of motion.        Right shoulder: She exhibits tenderness.        Left shoulder: She exhibits tenderness.        Right hip: She exhibits tenderness.        Right knee: Tenderness found.        Cervical back: She exhibits tenderness.        Lumbar back: She exhibits tenderness.   Lymphadenopathy:     She has cervical adenopathy.        Right cervical: Superficial cervical adenopathy present.   Neurological: She is alert and oriented to person, place, and time. She has normal reflexes. No cranial nerve deficit. She exhibits normal muscle tone.   Skin: Skin is warm and dry. No rash noted.   Psychiatric: She has a normal mood and affect. Her behavior is normal. Judgment and thought content normal.   Nursing note and vitals reviewed.  Labs reviewed    Assessment/Plan   Narcisa was seen today for referral to rheumatologist and referral to infectious disease.    Diagnoses and all orders for this visit:    Arthralgia, unspecified joint  -     Ambulatory Referral to Rheumatology    [place] spotted fever  -     Ambulatory Referral to Infectious Disease    Chronic EBV infection  -     Ambulatory Referral to Infectious Disease      Continue with current therapies.  Discussed possibility of fibromyalgia and possible SNRI therapy as well as possible Lyrica.  She states she is not averse to therapies but would like to be evaluated by specialist prior to starting medications

## 2017-08-08 RX ORDER — METHOCARBAMOL 750 MG/1
750 TABLET, FILM COATED ORAL 4 TIMES DAILY PRN
Qty: 120 TABLET | Refills: 0 | Status: SHIPPED | OUTPATIENT
Start: 2017-08-08

## 2017-08-18 ENCOUNTER — HOSPITAL ENCOUNTER (EMERGENCY)
Facility: HOSPITAL | Age: 37
Discharge: HOME | End: 2017-08-18
Attending: EMERGENCY MEDICINE | Admitting: EMERGENCY MEDICINE

## 2017-08-18 ENCOUNTER — APPOINTMENT (OUTPATIENT)
Dept: GENERAL RADIOLOGY | Facility: HOSPITAL | Age: 37
End: 2017-08-18

## 2017-08-18 VITALS
TEMPERATURE: 98.5 F | BODY MASS INDEX: 31.41 KG/M2 | SYSTOLIC BLOOD PRESSURE: 137 MMHG | HEIGHT: 60 IN | RESPIRATION RATE: 18 BRPM | OXYGEN SATURATION: 98 % | HEART RATE: 81 BPM | DIASTOLIC BLOOD PRESSURE: 85 MMHG | WEIGHT: 160 LBS
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
## 2017-08-18 DIAGNOSIS — I10 ESSENTIAL HYPERTENSION: ICD-10-CM

## 2017-08-18 DIAGNOSIS — B87.9: ICD-10-CM

## 2017-08-18 DIAGNOSIS — E87.6 HYPOKALEMIA: ICD-10-CM

## 2017-08-18 LAB
ALBUMIN SERPL-MCNC: 4.1 G/DL (ref 3.4–4.8)
ALBUMIN/GLOB SERPL: 1.4 G/DL (ref 1.1–1.8)
ALP SERPL-CCNC: 87 U/L (ref 38–126)
ALT SERPL W P-5'-P-CCNC: 27 U/L (ref 9–52)
ANION GAP SERPL CALCULATED.3IONS-SCNC: 8 MMOL/L (ref 5–15)
AST SERPL-CCNC: 21 U/L (ref 14–36)
BASOPHILS # BLD AUTO: 0.07 10*3/MM3 (ref 0–0.2)
BASOPHILS NFR BLD AUTO: 0.9 % (ref 0–2)
BILIRUB SERPL-MCNC: 0.5 MG/DL (ref 0.2–1.3)
BILIRUB UR QL STRIP: NEGATIVE
BUN BLD-MCNC: 11 MG/DL (ref 7–21)
BUN/CREAT SERPL: 12.2 (ref 7–25)
CALCIUM SPEC-SCNC: 9.3 MG/DL (ref 8.4–10.2)
CHLORIDE SERPL-SCNC: 101 MMOL/L (ref 95–110)
CLARITY UR: ABNORMAL
CO2 SERPL-SCNC: 28 MMOL/L (ref 22–31)
COLOR UR: YELLOW
CREAT BLD-MCNC: 0.9 MG/DL (ref 0.5–1)
DEPRECATED RDW RBC AUTO: 46.2 FL (ref 36.4–46.3)
EOSINOPHIL # BLD AUTO: 0.4 10*3/MM3 (ref 0–0.7)
EOSINOPHIL NFR BLD AUTO: 5.1 % (ref 0–7)
ERYTHROCYTE [DISTWIDTH] IN BLOOD BY AUTOMATED COUNT: 14.3 % (ref 11.5–14.5)
GFR SERPL CREATININE-BSD FRML MDRD: 71 ML/MIN/1.73 (ref 64–149)
GLOBULIN UR ELPH-MCNC: 3 GM/DL (ref 2.3–3.5)
GLUCOSE BLD-MCNC: 105 MG/DL (ref 60–100)
GLUCOSE UR STRIP-MCNC: NEGATIVE MG/DL
HCT VFR BLD AUTO: 37.8 % (ref 35–45)
HGB BLD-MCNC: 12.3 G/DL (ref 12–15.5)
HGB UR QL STRIP.AUTO: NEGATIVE
IMM GRANULOCYTES # BLD: 0.01 10*3/MM3 (ref 0–0.02)
IMM GRANULOCYTES NFR BLD: 0.1 % (ref 0–0.5)
KETONES UR QL STRIP: NEGATIVE
LEUKOCYTE ESTERASE UR QL STRIP.AUTO: NEGATIVE
LYMPHOCYTES # BLD AUTO: 2.63 10*3/MM3 (ref 0.6–4.2)
LYMPHOCYTES NFR BLD AUTO: 33.5 % (ref 10–50)
MCH RBC QN AUTO: 28.7 PG (ref 26.5–34)
MCHC RBC AUTO-ENTMCNC: 32.5 G/DL (ref 31.4–36)
MCV RBC AUTO: 88.3 FL (ref 80–98)
MONOCYTES # BLD AUTO: 0.63 10*3/MM3 (ref 0–0.9)
MONOCYTES NFR BLD AUTO: 8 % (ref 0–12)
NEUTROPHILS # BLD AUTO: 4.1 10*3/MM3 (ref 2–8.6)
NEUTROPHILS NFR BLD AUTO: 52.4 % (ref 37–80)
NITRITE UR QL STRIP: NEGATIVE
PH UR STRIP.AUTO: 7.5 [PH] (ref 5–9)
PLATELET # BLD AUTO: 314 10*3/MM3 (ref 150–450)
PMV BLD AUTO: 10.2 FL (ref 8–12)
POTASSIUM BLD-SCNC: 3.3 MMOL/L (ref 3.5–5.1)
PROT SERPL-MCNC: 7.1 G/DL (ref 6.3–8.6)
PROT UR QL STRIP: NEGATIVE
RBC # BLD AUTO: 4.28 10*6/MM3 (ref 3.77–5.16)
SODIUM BLD-SCNC: 137 MMOL/L (ref 137–145)
SP GR UR STRIP: 1.01 (ref 1–1.03)
UROBILINOGEN UR QL STRIP: ABNORMAL
WBC NRBC COR # BLD: 7.84 10*3/MM3 (ref 3.2–9.8)

## 2017-08-18 PROCEDURE — 85025 COMPLETE CBC W/AUTO DIFF WBC: CPT | Performed by: EMERGENCY MEDICINE

## 2017-08-18 PROCEDURE — 87169 MACROSCOPIC EXAM PARASITE: CPT | Performed by: EMERGENCY MEDICINE

## 2017-08-18 PROCEDURE — 96361 HYDRATE IV INFUSION ADD-ON: CPT

## 2017-08-18 PROCEDURE — 96360 HYDRATION IV INFUSION INIT: CPT

## 2017-08-18 PROCEDURE — 81003 URINALYSIS AUTO W/O SCOPE: CPT | Performed by: EMERGENCY MEDICINE

## 2017-08-18 PROCEDURE — 71020 HC CHEST PA AND LATERAL: CPT

## 2017-08-18 PROCEDURE — 80053 COMPREHEN METABOLIC PANEL: CPT | Performed by: EMERGENCY MEDICINE

## 2017-08-18 PROCEDURE — 99284 EMERGENCY DEPT VISIT MOD MDM: CPT

## 2017-08-18 RX ORDER — LISINOPRIL 10 MG/1
10 TABLET ORAL DAILY
Qty: 30 TABLET | Refills: 0 | Status: SHIPPED | OUTPATIENT
Start: 2017-08-18

## 2017-08-18 RX ORDER — SODIUM CHLORIDE 9 MG/ML
125 INJECTION, SOLUTION INTRAVENOUS CONTINUOUS
Status: DISCONTINUED | OUTPATIENT
Start: 2017-08-18 | End: 2017-08-19 | Stop reason: HOSPADM

## 2017-08-18 RX ORDER — POTASSIUM CHLORIDE 750 MG/1
40 CAPSULE, EXTENDED RELEASE ORAL ONCE
Status: COMPLETED | OUTPATIENT
Start: 2017-08-18 | End: 2017-08-18

## 2017-08-18 RX ORDER — SODIUM CHLORIDE 0.9 % (FLUSH) 0.9 %
10 SYRINGE (ML) INJECTION AS NEEDED
Status: DISCONTINUED | OUTPATIENT
Start: 2017-08-18 | End: 2017-08-19 | Stop reason: HOSPADM

## 2017-08-18 RX ADMIN — SODIUM CHLORIDE 125 ML/HR: 9 INJECTION, SOLUTION INTRAVENOUS at 21:27

## 2017-08-18 RX ADMIN — POTASSIUM CHLORIDE 40 MEQ: 750 CAPSULE, EXTENDED RELEASE ORAL at 21:46

## 2017-08-18 RX ADMIN — SODIUM CHLORIDE 500 ML: 9 INJECTION, SOLUTION INTRAVENOUS at 20:42

## 2017-08-19 NOTE — ED PROVIDER NOTES
"Subjective   HPI Comments: Patient has a history of \"the [place] spotted fever and Lyme disease and she has seen her doctor requesting to be transferred infection disease.  Now she comes to the emergency department because she has seen some lower bus coming out of the skin and some areas that look like insect bites she said [place]  On arrival she have temperature 99.6 with a heart rate of 104 and blood pressure 159/100    Patient bring in a plastic container a larvae or parasite.  This was sent to the lab for identification probably won't be obtained a result today.  She said that she got these out of her left breast area      Patient is a 37 y.o. female presenting with insect bite/sting.   History provided by:  Patient  Insect Bite   Contact animal:  Unable to specify  Location:  Torso  Torso injury location:  L breast  Pain details:     Quality:  Aching    Severity:  No pain    Timing:  Intermittent    Progression:  Waxing and waning  Incident location:  Home  Worsened by:  Nothing  Ineffective treatments:  None tried  Associated symptoms: rash    Associated symptoms: no fever and no numbness        Review of Systems   Constitutional: Negative for activity change, appetite change, fatigue and fever.   HENT: Negative for congestion, facial swelling, mouth sores, nosebleeds, sore throat and trouble swallowing.    Eyes: Negative for discharge, redness and itching.   Respiratory: Negative for apnea, cough and wheezing.    Cardiovascular: Negative for chest pain and palpitations.   Gastrointestinal: Negative for blood in stool and nausea.   Endocrine: Negative for cold intolerance, heat intolerance, polydipsia, polyphagia and polyuria.   Genitourinary: Negative for difficulty urinating, dysuria, flank pain, frequency and hematuria.   Musculoskeletal: Negative for gait problem, joint swelling and neck pain.   Skin: Positive for rash. Negative for color change and " pallor.   Allergic/Immunologic: Negative for environmental allergies.   Neurological: Negative for dizziness, seizures, syncope, speech difficulty, light-headedness, numbness and headaches.   Hematological: Negative for adenopathy.   Psychiatric/Behavioral: Negative for agitation, behavioral problems, confusion and sleep disturbance. The patient is not nervous/anxious.        Past Medical History:   Diagnosis Date   • Anxiety state    • Blood in stool    • Cervical disc disorder    • Cervical radiculopathy    • Colitis    • Costal chondritis    • Dental abscess    • Depressive disorder    • Elevated blood pressure    • Gastroenteritis    • Hip pain    • Joint pain    • Kidney stone    • Lumbar disc disorder with myelopathy    • Lyme disease, unspecified    • Neck pain    • Other neurologic disorders in Lyme disease    • Paresthesia    • Rash     C/O: a rash      • [place] spotted fever    • Shoulder pain    • Symptoms involving digestive system     Other symptoms involving digestive system      • Tick bite    • Urinary tract infection    • Uterine fibroid    • Vomiting and diarrhea        Allergies   Allergen Reactions   • Bacitracin Photosensitivity   • Gramicidin Photosensitivity   • Neomycin-Bacitracin Zn-Polymyx Rash   • Neosporin Plus Pain Relief Ms [Neomycin-Polymyxin-Pramoxine] Rash       Past Surgical History:   Procedure Laterality Date   • APPENDECTOMY     •  SECTION     • CHOLECYSTECTOMY     • COLONOSCOPY  2014   • DILATATION AND CURETTAGE     • ENDOSCOPY N/A 3/22/2017    Procedure: ESOPHAGOGASTRODUODENOSCOPY;  Surgeon: Vamshi Akhtar MD;  Location: [facility] ENDOSCOPY;  Service:    • INJECTION OF MEDICATION  2015    Depo Medrol (Methylprednisone) 80mg    • PROCEDURE GENERIC CONVERTED  2015    Drain/Inject Major Joint    • TUBAL ABDOMINAL LIGATION     • UPPER GASTROINTESTINAL ENDOSCOPY  2017       Family History   Problem Relation Age of Onset   • Thyroid disease  Mother    • Heart disease Brother    • Cancer Other    • Ulcers Other    • Stroke Other    • Cholelithiasis Other    • Hypertension Other        Social History     Social History   • Marital status: Single     Spouse name: N/A   • Number of children: 2   • Years of education: N/A     Social History Main Topics   • Smoking status: Never Smoker   • Smokeless tobacco: Never Used   • Alcohol use No   • Drug use: No   • Sexual activity: Yes     Partners: Male     Birth control/ protection: Surgical     Other Topics Concern   • None     Social History Narrative           Objective   Physical Exam   Constitutional: She is oriented to person, place, and time. She appears well-developed and well-nourished.   HENT:   Head: Normocephalic and atraumatic.   Nose: Nose normal.   Mouth/Throat: Oropharynx is clear and moist.   Eyes: Conjunctivae and EOM are normal. Pupils are equal, round, and reactive to light.   Neck: Normal range of motion. Neck supple.   Cardiovascular: Normal rate, regular rhythm, normal heart sounds and intact distal pulses.    Pulmonary/Chest: Effort normal and breath sounds normal.   Abdominal: Soft. Bowel sounds are normal.   Musculoskeletal: Normal range of motion.   Neurological: She is alert and oriented to person, place, and time.   Skin: Skin is warm and dry. There is erythema.   She had a few insect bite look alike areas on her torso and upper extremities   Psychiatric: She has a normal mood and affect. Her behavior is normal. Judgment and thought content normal.   Nursing note and vitals reviewed.      Procedures         ED Course  ED Course      Labs Reviewed   COMPREHENSIVE METABOLIC PANEL - Abnormal; Notable for the following:        Result Value    Glucose 105 (*)     Potassium 3.3 (*)     All other components within normal limits   URINALYSIS W/ CULTURE IF INDICATED - Abnormal; Notable for the following:     Appearance, UA Cloudy (*)     All other components within normal limits    Narrative:      Urine microscopic not indicated.   CBC WITH AUTO DIFFERENTIAL - Normal   PARASITE IDENTIFICATION   CBC AND DIFFERENTIAL    Narrative:     The following orders were created for panel order CBC & Differential.  Procedure                               Abnormality         Status                     ---------                               -----------         ------                     CBC Auto Differential[[id number]]        Normal              Final result                 Please view results for these tests on the individual orders.        XR Chest 2 View   Final Result   No active disease.      Electronically signed by:  Black Miller  8/18/2017 9:04 PM   CDT Workstation: [id number]                  [facility]    Final diagnoses:   Insect bites, initial encounter   Fly larva infestation   Hypokalemia   Essential hypertension            Rigoberto Harmon MD  08/19/17 8802

## 2017-08-19 NOTE — ED NOTES
Parasite brought in by pt that she states was extracted from a lesion on her chest collected in a sterile urine sample cup and sent to lab      Corine Huynh RN  08/18/17 2056

## 2017-08-24 LAB — REF LAB TEST RESULTS: NORMAL

## 2017-11-16 ENCOUNTER — APPOINTMENT (OUTPATIENT)
Dept: LAB | Facility: HOSPITAL | Age: 37
End: 2017-11-16

## 2017-11-16 ENCOUNTER — OFFICE VISIT (OUTPATIENT)
Dept: GASTROENTEROLOGY | Facility: CLINIC | Age: 37
End: 2017-11-16

## 2017-11-16 VITALS
BODY MASS INDEX: 31.57 KG/M2 | SYSTOLIC BLOOD PRESSURE: 142 MMHG | WEIGHT: 160.8 LBS | DIASTOLIC BLOOD PRESSURE: 80 MMHG | HEART RATE: 80 BPM | HEIGHT: 60 IN

## 2017-11-16 DIAGNOSIS — R10.10 PAIN OF UPPER ABDOMEN: ICD-10-CM

## 2017-11-16 DIAGNOSIS — R63.4 WEIGHT LOSS, ABNORMAL: ICD-10-CM

## 2017-11-16 DIAGNOSIS — R19.7 DIARRHEA, UNSPECIFIED TYPE: ICD-10-CM

## 2017-11-16 DIAGNOSIS — R13.10 DYSPHAGIA, UNSPECIFIED TYPE: ICD-10-CM

## 2017-11-16 DIAGNOSIS — R11.2 NON-INTRACTABLE VOMITING WITH NAUSEA, UNSPECIFIED VOMITING TYPE: Primary | ICD-10-CM

## 2017-11-16 PROCEDURE — 86003 ALLG SPEC IGE CRUDE XTRC EA: CPT | Performed by: PHYSICIAN ASSISTANT

## 2017-11-16 PROCEDURE — 83516 IMMUNOASSAY NONANTIBODY: CPT | Performed by: PHYSICIAN ASSISTANT

## 2017-11-16 PROCEDURE — 99214 OFFICE O/P EST MOD 30 MIN: CPT | Performed by: PHYSICIAN ASSISTANT

## 2017-11-16 PROCEDURE — 36415 COLL VENOUS BLD VENIPUNCTURE: CPT | Performed by: PHYSICIAN ASSISTANT

## 2017-11-16 RX ORDER — FERROUS SULFATE 325(65) MG
325 TABLET ORAL
COMMUNITY

## 2017-11-16 RX ORDER — PANTOPRAZOLE SODIUM 40 MG/1
40 TABLET, DELAYED RELEASE ORAL DAILY
Qty: 30 TABLET | Refills: 0 | Status: SHIPPED | OUTPATIENT
Start: 2017-11-16 | End: 2017-12-07 | Stop reason: SDUPTHER

## 2017-11-16 RX ORDER — DEXTROSE AND SODIUM CHLORIDE 5; .45 G/100ML; G/100ML
30 INJECTION, SOLUTION INTRAVENOUS CONTINUOUS PRN
Status: CANCELLED | OUTPATIENT
Start: 2017-11-30

## 2017-11-16 RX ORDER — SODIUM, POTASSIUM,MAG SULFATES 17.5-3.13G
1 SOLUTION, RECONSTITUTED, ORAL ORAL ONCE
Qty: 1 BOTTLE | Refills: 0 | Status: SHIPPED | OUTPATIENT
Start: 2017-11-16 | End: 2017-11-16

## 2017-11-16 RX ORDER — MINOCYCLINE HYDROCHLORIDE 100 MG/1
100 CAPSULE ORAL 2 TIMES DAILY
COMMUNITY

## 2017-11-16 RX ORDER — LUBIPROSTONE 24 UG/1
24 CAPSULE ORAL 2 TIMES DAILY WITH MEALS
Qty: 60 CAPSULE | Refills: 0 | Status: SHIPPED | OUTPATIENT
Start: 2017-11-16 | End: 2017-12-11

## 2017-11-16 RX ORDER — SPIRONOLACTONE 100 MG/1
100 TABLET, FILM COATED ORAL DAILY
COMMUNITY

## 2017-11-16 NOTE — PROGRESS NOTES
Chief Complaint   Patient presents with   • Nausea   • Vomiting   • Abdominal Pain   +    ENDO PROCEDURE ORDERED: EGDw/dil, COLON --N/V/D, dysphagia--look TI    Subjective    Narcisa Frances Maurice is a 37 y.o. female. she is here today for follow-up.    History of Present Illness    Patient presents with complaints of nausea, vomiting, epigastric and right lower quadrant abdominal pain.  Last seen 3/29/17.  She never returned for follow-up.  That time she was given Amitiza, states she was doing well.  She claims to try the Amitiza but doesn't think it helped her.  She is tried increasing her fiber.  She states she either has constipation or severe diarrhea.  Her stools have a strong chemical odor.  They are green, black, with specks of white in them.  She complains a 5 out of 10 mid upper and right lower quadrant abdominal pain.  She is on Protonix 40 mg daily.  She's having increasing hiccups and belching.  She's having fever and sweats.  She denied dysphagia.  Weight is down 18.1 pounds since last visit.  She does admit she's been on multiple rounds of doxycycline for [place] spotted fever.  She is currently on minocycline.    Urinalysis negative on 4/18/17.  Laboratory on 6/22/17 EBV was old.  Negative Ehrlichia.  RMSF positive IgM 2.0.  CMP normal, CBC normal.    Studies on 8/18/17 chest x-ray was negative.  Urinalysis negative.  CMP showed glucose 105, potassium 3.3, otherwise normal.  CBC normal.  She had a parasite identification showing fly larva.  Alpha-gal was negative.    Studies at [facility] on 9/15/17 showed negative sedimentation rate, negative TB test.  5-HIAA was negative on 11/7/17.  Laboratory on 11/6/17 showed normal CBC.  Negative celiac panel.  BMP was normal.  Ferritin 28.    A/P: Patient with variable abdominal pain, increasing GERD, fever and sweats, possible chronic RMSF with patient currently on minocycline.  Significant weight loss with variable bowel habits.  Recommend EGD with  possible dilatation, small bowel biopsy, colonoscopy with attempted look at the terminal ileum and biopsies.  Recommend obtaining stool pathogen panel as well as recurrent GI distress panel.  We'll see her in follow-up after the above, further pending clinical course and the results of the above.     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    • Paresthesia    • Rash     C/O: a rash      • [place] spotted fever    • Shoulder pain    • Symptoms involving digestive system     Other symptoms involving digestive system      • Tick bite    • Urinary tract infection    • Uterine fibroid    • Vomiting and diarrhea      Past Surgical History:   Procedure Laterality Date   • APPENDECTOMY     •  SECTION     • CHOLECYSTECTOMY     • COLONOSCOPY  2014   • DILATATION AND CURETTAGE     • ENDOSCOPY N/A 3/22/2017    Procedure: ESOPHAGOGASTRODUODENOSCOPY;  Surgeon: Vamshi Akhtar MD;  Location: [facility] ENDOSCOPY;  Service:    • INJECTION OF MEDICATION  2015    Depo Medrol (Methylprednisone) 80mg    • PROCEDURE GENERIC CONVERTED  2015    Drain/Inject Major Joint    • TUBAL ABDOMINAL LIGATION     • UPPER GASTROINTESTINAL ENDOSCOPY  2017     Family History   Problem Relation Age of Onset   • Thyroid disease Mother    • Heart disease Brother    • Cancer Other    • Ulcers Other    • Stroke Other    • Cholelithiasis Other    • Hypertension Other      OB History      Para Term  AB  Living    3 2   1 2    SAB TAB Ectopic Multiple Live Births    1            Allergies   Allergen Reactions   • Bacitracin Photosensitivity   • Gramicidin Photosensitivity   • Neomycin-Bacitracin Zn-Polymyx Rash   • Neosporin Plus Pain Relief Ms [Neomycin-Polymyxin-Pramoxine] Rash     Social History     Social History   • Marital status: Single     Spouse name: N/A   • Number of children: 2   • Years of education: N/A     Social History Main Topics   • Smoking status: Never Smoker   • Smokeless tobacco: Never Used   • Alcohol use No   • Drug use: No   • Sexual activity: Yes     Partners: Male     Birth control/ protection: Surgical     Other Topics Concern   • None     Social History Narrative       Current Outpatient Prescriptions:   •  ferrous sulfate 325 (65 FE) MG tablet, Take 325 mg by mouth 3 (Three) Times a Day With Meals., Disp: , Rfl:   •  fexofenadine (ALLEGRA) 180 MG tablet, Take 180 mg by mouth Daily. Take as directed , Disp: , Rfl:   •  Lactobacillus (PROBIOTIC ACIDOPHILUS PO), Take 1 tablet by mouth Daily., Disp: , Rfl:   •  lisinopril (PRINIVIL,ZESTRIL) 10 MG tablet, Take 1 tablet by mouth Daily., Disp: 30 tablet, Rfl: 0  •  lubiprostone (AMITIZA) 24 MCG capsule, Take 1 capsule by mouth 2 (Two) Times a Day With Meals., Disp: 60 capsule, Rfl: 0  •  methocarbamol (ROBAXIN) 750 MG tablet, Take 1 tablet by mouth 4 (Four) Times a Day As Needed for Muscle Spasms. (Patient taking differently: Take 750 mg by mouth Daily.), Disp: 120 tablet, Rfl: 0  •  minocycline (MINOCIN,DYNACIN) 100 MG capsule, Take 100 mg by mouth 2 (Two) Times a Day., Disp: , Rfl:   •  pantoprazole (PROTONIX) 40 MG EC tablet, Take 1 tablet by mouth Daily., Disp: 30 tablet, Rfl: 0  •  spironolactone (ALDACTONE) 100 MG tablet, Take 100 mg by mouth Daily., Disp: , Rfl:   Review of Systems  Review of Systems   Constitutional: Positive for unexpected weight change.   HENT: Positive for trouble swallowing.    Gastrointestinal: Positive for  "abdominal pain, diarrhea and nausea.          Objective    /80 (BP Location: Left arm)  Pulse 80  Ht 60\" (152.4 cm)  Wt 160 lb 12.8 oz (72.9 kg)  LMP 11/02/2017  BMI 31.4 kg/m2  Physical Exam   Constitutional: She is oriented to person, place, and time. She appears well-developed and well-nourished. No distress.   HENT:   Head: Normocephalic and atraumatic.   Eyes: EOM are normal. Pupils are equal, round, and reactive to light.   Neck: Normal range of motion.   Cardiovascular: Normal rate, regular rhythm and normal heart sounds.    Pulmonary/Chest: Effort normal and breath sounds normal.   Abdominal: Soft. Bowel sounds are normal. She exhibits no shifting dullness, no distension, no abdominal bruit, no ascites and no mass. There is no hepatosplenomegaly. There is tenderness. There is no rigidity, no rebound, no guarding and no CVA tenderness. No hernia. Hernia confirmed negative in the ventral area.   Obese, mild diffuse   Musculoskeletal: Normal range of motion.   Neurological: She is alert and oriented to person, place, and time.   Skin: Skin is warm and dry.   Psychiatric: She has a normal mood and affect. Her behavior is normal. Judgment and thought content normal.   Nursing note and vitals reviewed.    Assessment/Plan      1. Non-intractable vomiting with nausea, unspecified vomiting type    2. Diarrhea, unspecified type    3. Pain of upper abdomen    4. Weight loss, abnormal    5. Dysphagia, unspecified type    .   Narcisa was seen today for nausea, vomiting and abdominal pain.    Diagnoses and all orders for this visit:    Non-intractable vomiting with nausea, unspecified vomiting type  -     Recurrent Gastrointestinal Distress  -     Gastrointestinal Panel, PCR - Stool, Per Rectum  -     Case Request; Standing  -     dextrose 5 % and sodium chloride 0.45 % infusion; Infuse 30 mL/hr into a venous catheter Continuous As Needed (Start Prior to Procedure).  -     Case Request  -     Allergens (12) " Foods  -     Glia(IgA / G) & TTG(IgA / G)    Diarrhea, unspecified type  -     Recurrent Gastrointestinal Distress  -     Gastrointestinal Panel, PCR - Stool, Per Rectum  -     Case Request; Standing  -     dextrose 5 % and sodium chloride 0.45 % infusion; Infuse 30 mL/hr into a venous catheter Continuous As Needed (Start Prior to Procedure).  -     Case Request  -     Allergens (12) Foods  -     Glia(IgA / G) & TTG(IgA / G)    Pain of upper abdomen  -     Recurrent Gastrointestinal Distress  -     Gastrointestinal Panel, PCR - Stool, Per Rectum  -     Case Request; Standing  -     dextrose 5 % and sodium chloride 0.45 % infusion; Infuse 30 mL/hr into a venous catheter Continuous As Needed (Start Prior to Procedure).  -     Case Request  -     Allergens (12) Foods  -     Glia(IgA / G) & TTG(IgA / G)    Weight loss, abnormal  -     Recurrent Gastrointestinal Distress  -     Gastrointestinal Panel, PCR - Stool, Per Rectum  -     Case Request; Standing  -     dextrose 5 % and sodium chloride 0.45 % infusion; Infuse 30 mL/hr into a venous catheter Continuous As Needed (Start Prior to Procedure).  -     Case Request  -     Allergens (12) Foods  -     Glia(IgA / G) & TTG(IgA / G)    Dysphagia, unspecified type  -     Recurrent Gastrointestinal Distress  -     Gastrointestinal Panel, PCR - Stool, Per Rectum  -     Case Request; Standing  -     dextrose 5 % and sodium chloride 0.45 % infusion; Infuse 30 mL/hr into a venous catheter Continuous As Needed (Start Prior to Procedure).  -     Case Request  -     Allergens (12) Foods  -     Glia(IgA / G) & TTG(IgA / G)    Other orders  -     Obtain Informed Consent; Standing  -     POC Glucose Fingerstick; Standing  -     sodium-potassium-magnesium sulfates (SUPREP BOWEL PREP KIT) 17.5-3.13-1.6 GM/180ML solution oral solution; Take 1 bottle by mouth 1 (One) Time for 1 dose. Take as per instruction sheet for colonoscopy prep.  -     pantoprazole (PROTONIX) 40 MG EC tablet; Take 1  tablet by mouth Daily.  -     lubiprostone (AMITIZA) 24 MCG capsule; Take 1 capsule by mouth 2 (Two) Times a Day With Meals.        Orders placed during this encounter include:  Orders Placed This Encounter   Procedures   • Gastrointestinal Panel, PCR - Stool, Per Rectum   • Recurrent Gastrointestinal Distress   • Allergens (12) Foods   • Glia(IgA / G) & TTG(IgA / G)       Medications prescribed:  New Medications Ordered This Visit   Medications   • sodium-potassium-magnesium sulfates (SUPREP BOWEL PREP KIT) 17.5-3.13-1.6 GM/180ML solution oral solution     Sig: Take 1 bottle by mouth 1 (One) Time for 1 dose. Take as per instruction sheet for colonoscopy prep.     Dispense:  1 bottle     Refill:  0   • pantoprazole (PROTONIX) 40 MG EC tablet     Sig: Take 1 tablet by mouth Daily.     Dispense:  30 tablet     Refill:  0     No further refills until seen in office   • lubiprostone (AMITIZA) 24 MCG capsule     Sig: Take 1 capsule by mouth 2 (Two) Times a Day With Meals.     Dispense:  60 capsule     Refill:  0       Requested Prescriptions     Signed Prescriptions Disp Refills   • sodium-potassium-magnesium sulfates (SUPREP BOWEL PREP KIT) 17.5-3.13-1.6 GM/180ML solution oral solution 1 bottle 0     Sig: Take 1 bottle by mouth 1 (One) Time for 1 dose. Take as per instruction sheet for colonoscopy prep.   • pantoprazole (PROTONIX) 40 MG EC tablet 30 tablet 0     Sig: Take 1 tablet by mouth Daily.   • lubiprostone (AMITIZA) 24 MCG capsule 60 capsule 0     Sig: Take 1 capsule by mouth 2 (Two) Times a Day With Meals.       Review and/or summary of lab tests, radiology, procedures, medications. Review and summary of old records and obtaining of history. The risks and benefits of my recommendations, as well as other treatment options were discussed with the patient today. Questions were answered.    Follow-up: Return in about 6 weeks (around 12/28/2017), or if symptoms worsen or fail to improve.      ESOPHAGOGASTRODUODENOSCOPY--with dilation (N/A), COLONOSCOPY--biopsy, look TI (N/A)      This document has been electronically signed by Woody Noel PA-C on November 26, 2017 1:10 PM      Results for orders placed or performed in visit on 11/16/17   Gastrointestinal Panel, PCR - Stool, Per Rectum   Result Value Ref Range    Campylobacter Not Detected Not Detected    Clostridium difficile (toxin A/B) Not Detected Not Detected, NA formed stool, C diff not applicable on patient less than one year of age    Plesiomonas shigelloides Not Detected Not Detected    Salmonella Not Detected Not Detected    Vibrio Not Detected Not Detected    Vibrio cholerae Not Detected Not Detected    Yersinia enterocolitica Not Detected Not Detected    Enteroaggregative E. coli (EAEC) Not Detected Not Detected    Enteropathogenic E. coli (EPEC) Not Detected Not Detected    Enterotoxigenic E. coli (ETEC) lt/st Not Detected Not Detected    Shiga-like toxin-producing E. coli (STEC) stx1/stx2 Not Detected Not Detected    E. coli O157 Not Detected Not Detected    Shigella/Enteroinvasive E. coli (EIEC) Not Detected Not Detected    Cryptosporidium Not Detected Not Detected    Cyclospora cayetanensis Not Detected Not Detected    Entamoeba histolytica Not Detected Not Detected    Giardia lamblia Not Detected Not Detected    Adenovirus F40/41 Not Detected Not Detected    Astrovirus Not Detected Not Detected    Norovirus GI/GII Not Detected Not Detected    Rotavirus A Not Detected Not Detected    Sapovirus (I, II, IV or V) Not Detected Not Detected   Glia(IgA / G) & TTG(IgA / G)   Result Value Ref Range    Gliadin Deamidated Peptide Ab, IgA 5 0 - 19 units    Deaminated Gliadin Ab IgG 2 0 - 19 units    Tissue Transglutaminase IgA <2 0 - 3 U/mL    Tissue Transglutaminase IgG <2 0 - 5 U/mL   Allergens (12) Foods   Result Value Ref Range    Class Description Comment     Egg White <0.10 Class 0 kU/L    Milk, Cow's <0.10 Class 0 kU/L    CodFish <0.10  Class 0 kU/L    Sesame Seed <0.10 Class 0 kU/L    Peanut <0.10 Class 0 kU/L    Soybean <0.10 Class 0 kU/L    Hazelnut <0.10 Class 0 kU/L    Shrimp <0.10 Class 0 kU/L    Scallop <0.10 Class 0 kU/L    Gluten <0.10 Class 0 kU/L    [place] <0.10 Class 0 kU/L    Wheat <0.10 Class 0 kU/L   Results for orders placed or performed during the hospital encounter of 08/18/17   Urinalysis With / Culture If Indicated   Result Value Ref Range    Color, UA Yellow Yellow, Straw, Dark Yellow, Erin    Appearance, UA Cloudy (A) Clear    pH, UA 7.5 5.0 - 9.0    Specific Posey, UA 1.011 1.003 - 1.030    Glucose, UA Negative Negative    Ketones, UA Negative Negative    Bilirubin, UA Negative Negative    Blood, UA Negative Negative    Protein, UA Negative Negative    Leuk Esterase, UA Negative Negative    Nitrite, UA Negative Negative    Urobilinogen, UA 0.2 E.U./dL 0.2 - 1.0 E.U./dL   CBC Auto Differential   Result Value Ref Range    WBC 7.84 3.20 - 9.80 10*3/mm3    RBC 4.28 3.77 - 5.16 10*6/mm3    Hemoglobin 12.3 12.0 - 15.5 g/dL    Hematocrit 37.8 35.0 - 45.0 %    MCV 88.3 80.0 - 98.0 fL    MCH 28.7 26.5 - 34.0 pg    MCHC 32.5 31.4 - 36.0 g/dL    RDW 14.3 11.5 - 14.5 %    RDW-SD 46.2 36.4 - 46.3 fl    MPV 10.2 8.0 - 12.0 fL    Platelets 314 150 - 450 10*3/mm3    Neutrophil % 52.4 37.0 - 80.0 %    Lymphocyte % 33.5 10.0 - 50.0 %    Monocyte % 8.0 0.0 - 12.0 %    Eosinophil % 5.1 0.0 - 7.0 %    Basophil % 0.9 0.0 - 2.0 %    Immature Grans % 0.1 0.0 - 0.5 %    Neutrophils, Absolute 4.10 2.00 - 8.60 10*3/mm3    Lymphocytes, Absolute 2.63 0.60 - 4.20 10*3/mm3    Monocytes, Absolute 0.63 0.00 - 0.90 10*3/mm3    Eosinophils, Absolute 0.40 0.00 - 0.70 10*3/mm3    Basophils, Absolute 0.07 0.00 - 0.20 10*3/mm3    Immature Grans, Absolute 0.01 0.00 - 0.02 10*3/mm3   Parasite Identification, [facility] - Miscellaneous Test   Result Value Ref Range    Miscellaneous Lab Test Result See attached report    Comprehensive Metabolic Panel   Result  Value Ref Range    Glucose 105 (H) 60 - 100 mg/dL    BUN 11 7 - 21 mg/dL    Creatinine 0.90 0.50 - 1.00 mg/dL    Sodium 137 137 - 145 mmol/L    Potassium 3.3 (L) 3.5 - 5.1 mmol/L    Chloride 101 95 - 110 mmol/L    CO2 28.0 22.0 - 31.0 mmol/L    Calcium 9.3 8.4 - 10.2 mg/dL    Total Protein 7.1 6.3 - 8.6 g/dL    Albumin 4.10 3.40 - 4.80 g/dL    ALT (SGPT) 27 9 - 52 U/L    AST (SGOT) 21 14 - 36 U/L    Alkaline Phosphatase 87 38 - 126 U/L    Total Bilirubin 0.5 0.2 - 1.3 mg/dL    eGFR Non  Amer 71 64 - 149 mL/min/1.73    Globulin 3.0 2.3 - 3.5 gm/dL    A/G Ratio 1.4 1.1 - 1.8 g/dL    BUN/Creatinine Ratio 12.2 7.0 - 25.0    Anion Gap 8.0 5.0 - 15.0 mmol/L   Results for orders placed or performed during the hospital encounter of 06/22/17   TSH+Free T4   Result Value Ref Range    TSH 2.160 0.460 - 4.680 mIU/mL    Free T4 1.17 0.78 - 2.19 ng/dL   [facility] (IgG / M)   Result Value Ref Range    Spotted Fever Group IgG Negative Negative    RMSF IgM 2.00 (H) 0.00 - 0.89 index   EBV, Chrnic / Active Infection   Result Value Ref Range    EBV Early Antigen Ab, IgG 20.9 (H) 0.0 - 8.9 U/mL    EBV VCA IgG >600.0 (H) 0.0 - 17.9 U/mL    EBV Nuclear Antigen Ab, IgG 446.0 (H) 0.0 - 17.9 U/mL    Interpretation Comment      *Note: Due to a large number of results and/or encounters for the requested time period, some results have not been displayed. A complete set of results can be found in Results Review.       Some portions of this note have been dictated using voice recognition software and may contain errors and/or omissions.

## 2017-11-18 LAB
GLIADIN PEPTIDE IGA SER-ACNC: 5 UNITS (ref 0–19)
GLIADIN PEPTIDE IGG SER-ACNC: 2 UNITS (ref 0–19)
TTG IGA SER-ACNC: <2 U/ML (ref 0–3)
TTG IGG SER-ACNC: <2 U/ML (ref 0–5)

## 2017-11-20 LAB
ADV 40+41 DNA STL QL NAA+NON-PROBE: NOT DETECTED
ASTRO TYP 1-8 RNA STL QL NAA+NON-PROBE: NOT DETECTED
C CAYETANENSIS DNA STL QL NAA+NON-PROBE: NOT DETECTED
C DIFF TOX GENS STL QL NAA+PROBE: NOT DETECTED
CALIF WALNUT POLN IGE QN: <0.1 KU/L
CAMPY SP DNA.DIARRHEA STL QL NAA+PROBE: NOT DETECTED
CODFISH IGE QN: <0.1 KU/L
CONV CLASS DESCRIPTION: NORMAL
COW MILK IGE QN: <0.1 KU/L
CRYPTOSP STL CULT: NOT DETECTED
E COLI DNA SPEC QL NAA+PROBE: NOT DETECTED
E HISTOLYT AG STL-ACNC: NOT DETECTED
EAEC PAA PLAS AGGR+AATA ST NAA+NON-PRB: NOT DETECTED
EC STX1+STX2 GENES STL QL NAA+NON-PROBE: NOT DETECTED
EGG WHITE IGE QN: <0.1 KU/L
EPEC EAE GENE STL QL NAA+NON-PROBE: NOT DETECTED
ETEC LTA+ST1A+ST1B TOX ST NAA+NON-PROBE: NOT DETECTED
G LAMBLIA DNA SPEC QL NAA+PROBE: NOT DETECTED
GLUTEN IGE QN: <0.1 KU/L
HAZELNUT IGE QN: <0.1 KU/L
NOROVIRUS GI+II RNA STL QL NAA+NON-PROBE: NOT DETECTED
P SHIGELLOIDES DNA STL QL NAA+NON-PROBE: NOT DETECTED
PEANUT IGE QN: <0.1 KU/L
RV RNA STL NAA+PROBE: NOT DETECTED
SALMONELLA DNA SPEC QL NAA+PROBE: NOT DETECTED
SAPO I+II+IV+V RNA STL QL NAA+NON-PROBE: NOT DETECTED
SCALLOP IGE QN: <0.1 KU/L
SESAME SEED IGE: <0.1 KU/L
SHIGELLA SP+EIEC IPAH ST NAA+NON-PROBE: NOT DETECTED
SHRIMP IGE: <0.1 KU/L
SOYBEAN IGE QN: <0.1 KU/L
V CHOLERAE DNA SPEC QL NAA+PROBE: NOT DETECTED
VIBRIO DNA SPEC NAA+PROBE: NOT DETECTED
WHEAT IGE QN: <0.1 KU/L
YERSINIA STL CULT: NOT DETECTED

## 2017-11-20 PROCEDURE — 87507 IADNA-DNA/RNA PROBE TQ 12-25: CPT | Performed by: PHYSICIAN ASSISTANT

## 2017-11-30 ENCOUNTER — ANESTHESIA (OUTPATIENT)
Dept: GASTROENTEROLOGY | Facility: HOSPITAL | Age: 37
End: 2017-11-30

## 2017-11-30 ENCOUNTER — ANESTHESIA EVENT (OUTPATIENT)
Dept: GASTROENTEROLOGY | Facility: HOSPITAL | Age: 37
End: 2017-11-30

## 2017-11-30 ENCOUNTER — HOSPITAL ENCOUNTER (OUTPATIENT)
Facility: HOSPITAL | Age: 37
Setting detail: HOSPITAL OUTPATIENT SURGERY
Discharge: HOME | End: 2017-11-30
Attending: INTERNAL MEDICINE | Admitting: INTERNAL MEDICINE

## 2017-11-30 VITALS
HEIGHT: 60 IN | DIASTOLIC BLOOD PRESSURE: 89 MMHG | OXYGEN SATURATION: 99 % | HEART RATE: 95 BPM | RESPIRATION RATE: 18 BRPM | SYSTOLIC BLOOD PRESSURE: 131 MMHG | TEMPERATURE: 97 F

## 2017-11-30 DIAGNOSIS — R63.4 WEIGHT LOSS, ABNORMAL: ICD-10-CM

## 2017-11-30 DIAGNOSIS — R10.10 PAIN OF UPPER ABDOMEN: ICD-10-CM

## 2017-11-30 DIAGNOSIS — R19.7 DIARRHEA, UNSPECIFIED TYPE: ICD-10-CM

## 2017-11-30 DIAGNOSIS — R13.10 DYSPHAGIA, UNSPECIFIED TYPE: ICD-10-CM

## 2017-11-30 DIAGNOSIS — R11.2 NON-INTRACTABLE VOMITING WITH NAUSEA, UNSPECIFIED VOMITING TYPE: ICD-10-CM

## 2017-11-30 PROCEDURE — 25010000002 PROPOFOL 10 MG/ML EMULSION: Performed by: NURSE ANESTHETIST, CERTIFIED REGISTERED

## 2017-11-30 PROCEDURE — 88305 TISSUE EXAM BY PATHOLOGIST: CPT | Performed by: PATHOLOGY

## 2017-11-30 PROCEDURE — 43239 EGD BIOPSY SINGLE/MULTIPLE: CPT | Performed by: INTERNAL MEDICINE

## 2017-11-30 PROCEDURE — 45380 COLONOSCOPY AND BIOPSY: CPT | Performed by: INTERNAL MEDICINE

## 2017-11-30 PROCEDURE — 88305 TISSUE EXAM BY PATHOLOGIST: CPT | Performed by: INTERNAL MEDICINE

## 2017-11-30 PROCEDURE — 25010000002 MIDAZOLAM PER 1 MG: Performed by: NURSE ANESTHETIST, CERTIFIED REGISTERED

## 2017-11-30 RX ORDER — LIDOCAINE HYDROCHLORIDE 10 MG/ML
INJECTION, SOLUTION INFILTRATION; PERINEURAL AS NEEDED
Status: DISCONTINUED | OUTPATIENT
Start: 2017-11-30 | End: 2017-11-30 | Stop reason: SURG

## 2017-11-30 RX ORDER — PROPOFOL 10 MG/ML
VIAL (ML) INTRAVENOUS AS NEEDED
Status: DISCONTINUED | OUTPATIENT
Start: 2017-11-30 | End: 2017-11-30 | Stop reason: SURG

## 2017-11-30 RX ORDER — DEXTROSE AND SODIUM CHLORIDE 5; .45 G/100ML; G/100ML
20 INJECTION, SOLUTION INTRAVENOUS CONTINUOUS
Status: DISCONTINUED | OUTPATIENT
Start: 2017-11-30 | End: 2017-11-30 | Stop reason: HOSPADM

## 2017-11-30 RX ORDER — MIDAZOLAM HYDROCHLORIDE 1 MG/ML
INJECTION INTRAMUSCULAR; INTRAVENOUS AS NEEDED
Status: DISCONTINUED | OUTPATIENT
Start: 2017-11-30 | End: 2017-11-30 | Stop reason: SURG

## 2017-11-30 RX ADMIN — DEXTROSE AND SODIUM CHLORIDE 20 ML/HR: 5; 450 INJECTION, SOLUTION INTRAVENOUS at 16:04

## 2017-11-30 RX ADMIN — PROPOFOL 30 MG: 10 INJECTION, EMULSION INTRAVENOUS at 17:51

## 2017-11-30 RX ADMIN — PROPOFOL 30 MG: 10 INJECTION, EMULSION INTRAVENOUS at 17:46

## 2017-11-30 RX ADMIN — LIDOCAINE HYDROCHLORIDE 60 MG: 10 INJECTION, SOLUTION INFILTRATION; PERINEURAL at 17:43

## 2017-11-30 RX ADMIN — PROPOFOL 20 MG: 10 INJECTION, EMULSION INTRAVENOUS at 17:55

## 2017-11-30 RX ADMIN — PROPOFOL 20 MG: 10 INJECTION, EMULSION INTRAVENOUS at 17:48

## 2017-11-30 RX ADMIN — PROPOFOL 70 MG: 10 INJECTION, EMULSION INTRAVENOUS at 17:43

## 2017-11-30 RX ADMIN — PROPOFOL 40 MG: 10 INJECTION, EMULSION INTRAVENOUS at 17:45

## 2017-11-30 RX ADMIN — PROPOFOL 40 MG: 10 INJECTION, EMULSION INTRAVENOUS at 17:44

## 2017-11-30 RX ADMIN — MIDAZOLAM 2 MG: 1 INJECTION INTRAMUSCULAR; INTRAVENOUS at 17:43

## 2017-11-30 NOTE — ANESTHESIA POSTPROCEDURE EVALUATION
Patient: Narcisa Maurice    Procedure Summary     Date Anesthesia Start Anesthesia Stop Room / Location    11/30/17 8212 [id number] [facility] ENDOSCOPY 2 / [facility] ENDOSCOPY       Procedure Diagnosis Surgeon Provider    ESOPHAGOGASTRODUODENOSCOPY--with dilation (N/A Esophagus); COLONOSCOPY--biopsy, look TI (N/A ) Weight loss, abnormal; Pain of upper abdomen; Dysphagia, unspecified type; Diarrhea, unspecified type; Non-intractable vomiting with nausea, unspecified vomiting type  (Weight loss, abnormal [R63.4]; Pain of upper abdomen [R10.10]; Dysphagia, unspecified type [R13.10]; Diarrhea, unspecified type [R19.7]; Non-intractable vomiting with nausea, unspecified vomiting type [R11.2]) MD Gerda Guardado CRNA          Anesthesia Type: MAC  Last vitals  BP   124/89 (11/30/17 1548)   Temp   97.8 °F (36.6 °C) (11/30/17 1548)   Pulse   77 (11/30/17 1548)   Resp   18 (11/30/17 1548)     SpO2   98 % (11/30/17 1548)     Post Anesthesia Care and Evaluation    Patient location during evaluation: bedside  Patient participation: complete - patient participated  Level of consciousness: sleepy but conscious  Pain management: adequate  Airway patency: patent  Anesthetic complications: No anesthetic complications  PONV Status: none  Cardiovascular status: acceptable  Respiratory status: acceptable  Hydration status: acceptable

## 2017-11-30 NOTE — ANESTHESIA PREPROCEDURE EVALUATION
Anesthesia Evaluation     no history of anesthetic complications:  NPO Solid Status: > 8 hours  NPO Liquid Status: > 2 hours     Airway   Mallampati: I  TM distance: >3 FB  Neck ROM: full  no difficulty expected  Dental    (+) upper dentures    Pulmonary - negative pulmonary ROS and normal exam   Cardiovascular - normal exam    (+) hypertension well controlled,       Neuro/Psych  (+) numbness, psychiatric history Depression and Anxiety,    GI/Hepatic/Renal/Endo      Musculoskeletal     (+) neck pain,   Abdominal    Substance History      OB/GYN          Other                                        Anesthesia Plan    ASA 2     MAC     intravenous induction   Anesthetic plan and risks discussed with patient.

## 2017-12-01 NOTE — DISCHARGE INSTRUCTIONS
Outpatient Instructions for Monitored Anesthesia Care (MAC)    1. You will be released from the hospital in the care of a responsible adult who should remain with you for at least 6 hours.    2. You are at an increased risk for falls following anesthesia. Use care when changing from a lying to a sitting position. Use your assistive devices ( example: cane, walker or family member).    3. You must NOT drive a car, climb high places such as a ladder, or operate equipment such as electric knives,stoves etc...for at least 12 hours. If you are dizzy for longer than 24 hours, notify your doctor.    4. DO NOT drink any alcoholic beverages for at least 24 hours. Anesthesia may impair your judgement.    5. If you smoke, do not smoke alone due to increased risk of burns/fires.    6. DO NOT undertake any legally binding commitment for at least 24 hours. Anesthesia may impair your judgement. Outpatient Instructions for Monitored Anesthesia Care (MAC)    1. You will be released from the hospital in the care of a responsible adult who should remain with you for at least 6 hours.    2. You are at an increased risk for falls following anesthesia. Use care when changing from a lying to a sitting position. Use your assistive devices ( example: cane, walker or family member).    3. You must NOT drive a car, climb high places such as a ladder, or operate equipment such as electric knives,stoves etc...for at least 12 hours. If you are dizzy for longer than 24 hours, notify your doctor.    4. DO NOT drink any alcoholic beverages for at least 24 hours. Anesthesia may impair your judgement.    5. If you smoke, do not smoke alone due to increased risk of burns/fires.    6. DO NOT undertake any legally binding commitment for at least 24 hours. Anesthesia may impair your judgement.

## 2017-12-01 NOTE — PLAN OF CARE
Problem: Patient Care Overview (Adult)  Goal: Plan of Care Review  Outcome: Outcome(s) achieved Date Met:  11/30/17 11/30/17 1810   Coping/Psychosocial Response Interventions   Plan Of Care Reviewed With patient   Patient Care Overview   Progress no change         Problem: GI Endoscopy (Adult)  Goal: Signs and Symptoms of Listed Potential Problems Will be Absent or Manageable (GI Endoscopy)  Outcome: Outcome(s) achieved Date Met:  11/30/17 11/30/17 1810   GI Endoscopy   Problems Assessed (GI Endoscopy) all   Problems Present (GI Endoscopy) none

## 2017-12-04 LAB
LAB AP CASE REPORT: NORMAL
Lab: NORMAL
PATH REPORT.FINAL DX SPEC: NORMAL
PATH REPORT.GROSS SPEC: NORMAL

## 2017-12-07 ENCOUNTER — TELEPHONE (OUTPATIENT)
Dept: GASTROENTEROLOGY | Facility: CLINIC | Age: 37
End: 2017-12-07

## 2017-12-07 RX ORDER — PANTOPRAZOLE SODIUM 40 MG/1
40 TABLET, DELAYED RELEASE ORAL DAILY
Qty: 30 TABLET | Refills: 0 | Status: SHIPPED | OUTPATIENT
Start: 2017-12-07 | End: 2017-12-11

## 2017-12-07 NOTE — TELEPHONE ENCOUNTER
Patient Rx for Protonix 40 mg tab  required prior authorization.  Authorization is obtained and is valid 12/7/2017-12/. [facility] is notified at [phone] stated pt picked up medication on 11/30/17 and paid out of pocket and no other refills until pt see provider again. In basket message was sent to provider's nurse for refill request.

## 2017-12-11 ENCOUNTER — OFFICE VISIT (OUTPATIENT)
Dept: GASTROENTEROLOGY | Facility: CLINIC | Age: 37
End: 2017-12-11

## 2017-12-11 VITALS
BODY MASS INDEX: 32.39 KG/M2 | SYSTOLIC BLOOD PRESSURE: 118 MMHG | HEIGHT: 60 IN | DIASTOLIC BLOOD PRESSURE: 72 MMHG | HEART RATE: 68 BPM | WEIGHT: 165 LBS

## 2017-12-11 DIAGNOSIS — K58.1 IRRITABLE BOWEL SYNDROME WITH CONSTIPATION: ICD-10-CM

## 2017-12-11 DIAGNOSIS — K59.00 CONSTIPATION, UNSPECIFIED CONSTIPATION TYPE: Primary | ICD-10-CM

## 2017-12-11 DIAGNOSIS — R10.84 GENERALIZED ABDOMINAL PAIN: ICD-10-CM

## 2017-12-11 DIAGNOSIS — K21.00 GASTROESOPHAGEAL REFLUX DISEASE WITH ESOPHAGITIS: ICD-10-CM

## 2017-12-11 PROCEDURE — 99214 OFFICE O/P EST MOD 30 MIN: CPT | Performed by: PHYSICIAN ASSISTANT

## 2017-12-11 RX ORDER — OMEPRAZOLE 40 MG/1
40 CAPSULE, DELAYED RELEASE ORAL DAILY
Qty: 30 CAPSULE | Refills: 3 | Status: SHIPPED | OUTPATIENT
Start: 2017-12-11

## 2017-12-11 NOTE — PROGRESS NOTES
Chief Complaint   Patient presents with   • Nausea   • Vomiting   • Diarrhea   • Weight Loss       ENDO PROCEDURE ORDERED:    Subjective    Narcisa Maurice is a 37 y.o. female. she is here today for follow-up.    History of Present Illness    Patient seen on a recheck of her nausea, vomiting, diarrhea, weight loss.  Last seen 11/16/17.  Patient has been on minocycline for RMSF.  She did have a GI pathogen panel, recurrent GI distress panel negative on 11/20/17.  Patient underwent EGD/colonoscopy on 11/30/17 showed a chronic esophagitis, significant gastritis, distal esophageal biopsy showed mild inflammation.  Antral biopsy showed mild chronic gastritis, negative H. pylori.  Duodenal biopsy showed mild chronic inflammation.  Colonoscopy showed internal/external hemorrhoids, fair prep.  Random biopsy of the terminal ileum and colon were negative.  Recommend repeat colonoscopy by age 52.    Patient presents with abdominal pain 4 out of 10.  She is on Protonix 40 mg daily for chronic heartburn.  She denied nausea, vomiting, dysphagia.  She has not tried Amitiza.  She states stool softeners do not help, milk of magnesia helps, fiber does not help.  She's been taking a probiotic.  Stools are hard and dry.  She is on iron.  Weight is up 4.2 pounds since last visit.    A/P: Esophagitis and gastritis with negative biopsies.  We'll discontinue Protonix and try switching her to Prilosec 40 mg daily.  Insurance will not cover more appropriate medication.  We'll give her a trial on Linzess 72 and 145 µg samples were given to the patient to try.  She will call us back if she gets good results with one of these medications will try to get that approved through her insurance.  Otherwise we'll plan follow-up in 6 weeks, further pending clinical course and the results of the above.     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    • Paresthesia    • Rash     C/O: a rash      • [place] spotted fever    • Shoulder pain    • Symptoms involving digestive system     Other symptoms involving digestive system      • Tick bite    • Urinary tract infection    • Uterine fibroid    • Vomiting and diarrhea      Past Surgical History:   Procedure Laterality Date   • APPENDECTOMY     •  SECTION     • CHOLECYSTECTOMY     • COLONOSCOPY  2014   • COLONOSCOPY N/A 2017    Procedure: COLONOSCOPY--biopsy, look TI;  Surgeon: Vamshi Akhtar MD;  Location: [facility] ENDOSCOPY;  Service:    • DILATATION AND CURETTAGE     • ENDOSCOPY N/A 3/22/2017    Procedure: ESOPHAGOGASTRODUODENOSCOPY;  Surgeon: Vamshi Akhtar MD;  Location: [facility] ENDOSCOPY;  Service:    • ENDOSCOPY N/A 2017    Procedure: ESOPHAGOGASTRODUODENOSCOPY--with dilation;  Surgeon: Vamshi Akhtar MD;  Location: [facility] ENDOSCOPY;  Service:    • INJECTION OF MEDICATION  2015    Depo Medrol (Methylprednisone) 80mg    • PROCEDURE GENERIC CONVERTED  2015    Drain/Inject Major Joint    • TUBAL ABDOMINAL LIGATION     • UPPER GASTROINTESTINAL ENDOSCOPY  2017   • UPPER GASTROINTESTINAL ENDOSCOPY  2017     Family History   Problem Relation Age of Onset   • Thyroid disease Mother    • Heart disease Brother    • Cancer Other    • Ulcers Other    • Stroke Other    • Cholelithiasis Other    • Hypertension Other      OB History      Para Term  AB Living    3 2   1 2    SAB TAB Ectopic Multiple Live Births    1            Allergies  "  Allergen Reactions   • Bacitracin Photosensitivity   • Gramicidin Photosensitivity   • Neomycin-Bacitracin Zn-Polymyx Rash   • Neosporin Plus Pain Relief Ms [Neomycin-Polymyxin-Pramoxine] Rash     Social History     Social History   • Marital status: Single     Spouse name: N/A   • Number of children: 2   • Years of education: N/A     Social History Main Topics   • Smoking status: Never Smoker   • Smokeless tobacco: Never Used   • Alcohol use No   • Drug use: No   • Sexual activity: Yes     Partners: Male     Birth control/ protection: Surgical     Other Topics Concern   • None     Social History Narrative       Current Outpatient Prescriptions:   •  ferrous sulfate 325 (65 FE) MG tablet, Take 325 mg by mouth 3 (Three) Times a Day With Meals., Disp: , Rfl:   •  fexofenadine (ALLEGRA) 180 MG tablet, Take 180 mg by mouth Daily. Take as directed , Disp: , Rfl:   •  Lactobacillus (PROBIOTIC ACIDOPHILUS PO), Take 1 tablet by mouth Daily., Disp: , Rfl:   •  lisinopril (PRINIVIL,ZESTRIL) 10 MG tablet, Take 1 tablet by mouth Daily., Disp: 30 tablet, Rfl: 0  •  methocarbamol (ROBAXIN) 750 MG tablet, Take 1 tablet by mouth 4 (Four) Times a Day As Needed for Muscle Spasms. (Patient taking differently: Take 750 mg by mouth Daily.), Disp: 120 tablet, Rfl: 0  •  minocycline (MINOCIN,DYNACIN) 100 MG capsule, Take 100 mg by mouth 2 (Two) Times a Day., Disp: , Rfl:   •  spironolactone (ALDACTONE) 100 MG tablet, Take 100 mg by mouth Daily., Disp: , Rfl:   •  omeprazole (priLOSEC) 40 MG capsule, Take 1 capsule by mouth Daily., Disp: 30 capsule, Rfl: 3  Review of Systems  Review of Systems       Objective    /72 (BP Location: Left arm)  Pulse 68  Ht 152.4 cm (60\")  Wt 74.8 kg (165 lb)  LMP 11/29/2017  BMI 32.22 kg/m2  Physical Exam   Constitutional: She is oriented to person, place, and time. She appears well-developed and well-nourished. No distress.   HENT:   Head: Normocephalic and atraumatic.   Eyes: EOM are normal. " Pupils are equal, round, and reactive to light.   Neck: Normal range of motion.   Cardiovascular: Normal rate, regular rhythm and normal heart sounds.    Pulmonary/Chest: Effort normal and breath sounds normal.   Abdominal: Soft. Bowel sounds are normal. She exhibits no shifting dullness, no distension, no abdominal bruit, no ascites and no mass. There is no hepatosplenomegaly. There is tenderness. There is no rigidity, no rebound, no guarding and no CVA tenderness. No hernia. Hernia confirmed negative in the ventral area.   Obese, mild diffuse   Musculoskeletal: Normal range of motion.   Neurological: She is alert and oriented to person, place, and time.   Skin: Skin is warm and dry.   Psychiatric: She has a normal mood and affect. Her behavior is normal. Judgment and thought content normal.   Nursing note and vitals reviewed.    Assessment/Plan      1. Constipation, unspecified constipation type    2. Gastroesophageal reflux disease with esophagitis    3. Irritable bowel syndrome with constipation    4. Generalized abdominal pain    .   Narcisa was seen today for nausea, vomiting, diarrhea and weight loss.    Diagnoses and all orders for this visit:    Constipation, unspecified constipation type    Gastroesophageal reflux disease with esophagitis    Irritable bowel syndrome with constipation    Generalized abdominal pain    Other orders  -     omeprazole (priLOSEC) 40 MG capsule; Take 1 capsule by mouth Daily.        Orders placed during this encounter include:  No orders of the defined types were placed in this encounter.      Medications prescribed:  New Medications Ordered This Visit   Medications   • omeprazole (priLOSEC) 40 MG capsule     Sig: Take 1 capsule by mouth Daily.     Dispense:  30 capsule     Refill:  3     Discontinued Medications       Reason for Discontinue    lubiprostone (AMITIZA) 24 MCG capsule Not Efficacious        Requested Prescriptions     Signed Prescriptions Disp Refills   • omeprazole  (priLOSEC) 40 MG capsule 30 capsule 3     Sig: Take 1 capsule by mouth Daily.       Review and/or summary of lab tests, radiology, procedures, medications. Review and summary of old records and obtaining of history. The risks and benefits of my recommendations, as well as other treatment options were discussed with the patient today. Questions were answered.    Follow-up: Return in about 6 weeks (around 1/22/2018), or if symptoms worsen or fail to improve.     * Surgery not found *      This document has been electronically signed by Woody Noel PA-C on December 23, 2017 2:41 PM      Results for orders placed or performed during the hospital encounter of 11/30/17   Tissue Pathology Exam - Tissue, Gastric, Antrum   Result Value Ref Range    Case Report       Surgical Pathology Report                         Case: [id number]                                  Authorizing Provider:  Vamshi Akhtar MD         Collected:           11/30/2017 05:51 PM          Ordering Location:     [facility]             Received:            12/01/2017 07:58 AM                                 [facility]                                                     Pathologist:           Lex Tinajero MD                                                         Specimens:   1) - Gastric, Antrum                                                                                2) - Small Intestine, Duodenum                                                                      3) - Esophagus, Distal                                                                              4) - Small Intestine, Ileum, terminal ileum                                                         5) - Large Intestine, colonic mucosa                                                       Final Diagnosis       1.  GASTRIC ANTRUM, MUCOSAL BIOPSY:  MILD CHRONIC GASTRITIS.  NO EVIDENCE OF HELICOBACTER PYLORI.    2.  DUODENUM, MUCOSAL BIOPSY:  MILD CHRONIC  INFLAMMATION.    3.  DISTAL ESOPHAGUS, MUCOSAL BIOPSY:  SEGMENTS OF MILDLY INFLAMED STRATIFIED SQUAMOUS EPITHELIUM.    4.  TERMINAL ILEUM, MUCOSAL BIOPSY:  NO SIGNIFICANT PATHOLOGIC DIAGNOSIS.    5.  COLONIC MUCOSAL BIOPSY, RANDOM:  NO SIGNIFICANT PATHOLOGIC DIAGNOSIS.      Gross Description       In 5 containers, each of these show mucosal biopsies measuring up to 0.3 cm in greatest dimension.  Embedded accordingly.  1A antrum; 2A duodenum; 3A distal esophagus; 4A terminal ileum; 5A colonic mucosa.      Embedded Images     Results for orders placed or performed in visit on 11/16/17   Gastrointestinal Panel, PCR - Stool, Per Rectum   Result Value Ref Range    Campylobacter Not Detected Not Detected    Clostridium difficile (toxin A/B) Not Detected Not Detected, NA formed stool, C diff not applicable on patient less than one year of age    Plesiomonas shigelloides Not Detected Not Detected    Salmonella Not Detected Not Detected    Vibrio Not Detected Not Detected    Vibrio cholerae Not Detected Not Detected    Yersinia enterocolitica Not Detected Not Detected    Enteroaggregative E. coli (EAEC) Not Detected Not Detected    Enteropathogenic E. coli (EPEC) Not Detected Not Detected    Enterotoxigenic E. coli (ETEC) lt/st Not Detected Not Detected    Shiga-like toxin-producing E. coli (STEC) stx1/stx2 Not Detected Not Detected    E. coli O157 Not Detected Not Detected    Shigella/Enteroinvasive E. coli (EIEC) Not Detected Not Detected    Cryptosporidium Not Detected Not Detected    Cyclospora cayetanensis Not Detected Not Detected    Entamoeba histolytica Not Detected Not Detected    Giardia lamblia Not Detected Not Detected    Adenovirus F40/41 Not Detected Not Detected    Astrovirus Not Detected Not Detected    Norovirus GI/GII Not Detected Not Detected    Rotavirus A Not Detected Not Detected    Sapovirus (I, II, IV or V) Not Detected Not Detected   Glia(IgA / G) & TTG(IgA / G)   Result Value Ref Range    Gliadin  Deamidated Peptide Ab, IgA 5 0 - 19 units    Deaminated Gliadin Ab IgG 2 0 - 19 units    Tissue Transglutaminase IgA <2 0 - 3 U/mL    Tissue Transglutaminase IgG <2 0 - 5 U/mL   Allergens (12) Foods   Result Value Ref Range    Class Description Comment     Egg White <0.10 Class 0 kU/L    Milk, Cow's <0.10 Class 0 kU/L    CodFish <0.10 Class 0 kU/L    Sesame Seed <0.10 Class 0 kU/L    Peanut <0.10 Class 0 kU/L    Soybean <0.10 Class 0 kU/L    Hazelnut <0.10 Class 0 kU/L    Shrimp <0.10 Class 0 kU/L    Scallop <0.10 Class 0 kU/L    Gluten <0.10 Class 0 kU/L    Milroy <0.10 Class 0 kU/L    Wheat <0.10 Class 0 kU/L   Results for orders placed or performed during the hospital encounter of 08/18/17   Urinalysis With / Culture If Indicated   Result Value Ref Range    Color, UA Yellow Yellow, Straw, Dark Yellow, Erin    Appearance, UA Cloudy (A) Clear    pH, UA 7.5 5.0 - 9.0    Specific [place], UA 1.011 1.003 - 1.030    Glucose, UA Negative Negative    Ketones, UA Negative Negative    Bilirubin, UA Negative Negative    Blood, UA Negative Negative    Protein, UA Negative Negative    Leuk Esterase, UA Negative Negative    Nitrite, UA Negative Negative    Urobilinogen, UA 0.2 E.U./dL 0.2 - 1.0 E.U./dL   CBC Auto Differential   Result Value Ref Range    WBC 7.84 3.20 - 9.80 10*3/mm3    RBC 4.28 3.77 - 5.16 10*6/mm3    Hemoglobin 12.3 12.0 - 15.5 g/dL    Hematocrit 37.8 35.0 - 45.0 %    MCV 88.3 80.0 - 98.0 fL    MCH 28.7 26.5 - 34.0 pg    MCHC 32.5 31.4 - 36.0 g/dL    RDW 14.3 11.5 - 14.5 %    RDW-SD 46.2 36.4 - 46.3 fl    MPV 10.2 8.0 - 12.0 fL    Platelets 314 150 - 450 10*3/mm3    Neutrophil % 52.4 37.0 - 80.0 %    Lymphocyte % 33.5 10.0 - 50.0 %    Monocyte % 8.0 0.0 - 12.0 %    Eosinophil % 5.1 0.0 - 7.0 %    Basophil % 0.9 0.0 - 2.0 %    Immature Grans % 0.1 0.0 - 0.5 %    Neutrophils, Absolute 4.10 2.00 - 8.60 10*3/mm3    Lymphocytes, Absolute 2.63 0.60 - 4.20 10*3/mm3    Monocytes, Absolute 0.63 0.00 - 0.90 10*3/mm3     Eosinophils, Absolute 0.40 0.00 - 0.70 10*3/mm3    Basophils, Absolute 0.07 0.00 - 0.20 10*3/mm3    Immature Grans, Absolute 0.01 0.00 - 0.02 10*3/mm3   Parasite Identification, [facility] - Miscellaneous Test   Result Value Ref Range    Miscellaneous Lab Test Result See attached report    Comprehensive Metabolic Panel   Result Value Ref Range    Glucose 105 (H) 60 - 100 mg/dL    BUN 11 7 - 21 mg/dL    Creatinine 0.90 0.50 - 1.00 mg/dL    Sodium 137 137 - 145 mmol/L    Potassium 3.3 (L) 3.5 - 5.1 mmol/L    Chloride 101 95 - 110 mmol/L    CO2 28.0 22.0 - 31.0 mmol/L    Calcium 9.3 8.4 - 10.2 mg/dL    Total Protein 7.1 6.3 - 8.6 g/dL    Albumin 4.10 3.40 - 4.80 g/dL    ALT (SGPT) 27 9 - 52 U/L    AST (SGOT) 21 14 - 36 U/L    Alkaline Phosphatase 87 38 - 126 U/L    Total Bilirubin 0.5 0.2 - 1.3 mg/dL    eGFR Non  Amer 71 64 - 149 mL/min/1.73    Globulin 3.0 2.3 - 3.5 gm/dL    A/G Ratio 1.4 1.1 - 1.8 g/dL    BUN/Creatinine Ratio 12.2 7.0 - 25.0    Anion Gap 8.0 5.0 - 15.0 mmol/L   Results for orders placed or performed during the hospital encounter of 06/22/17   TSH+Free T4   Result Value Ref Range    TSH 2.160 0.460 - 4.680 mIU/mL    Free T4 1.17 0.78 - 2.19 ng/dL     *Note: Due to a large number of results and/or encounters for the requested time period, some results have not been displayed. A complete set of results can be found in Results Review.       Some portions of this note have been dictated using voice recognition software and may contain errors and/or omissions.

## 2018-01-23 ENCOUNTER — DOCUMENTATION (OUTPATIENT)
Dept: GASTROENTEROLOGY | Facility: CLINIC | Age: 38
End: 2018-01-23

## 2018-01-23 NOTE — PROGRESS NOTES
01/05/2018 - Patient Narcisa Maurice, 1980, given samples of Linzess 145 MCG Capsules, 1 capsule by mouth every morning before Breakfast as prescribed per Woody Noel PA-C and a Linzess co-pay card.  Patient instructed patient will need to activate Linzess co-pay card prior to attempted utilization.  Patient verbalized understanding.

## (undated) DEVICE — SINGLE-USE BIOPSY FORCEPS: Brand: RADIAL JAW 4

## (undated) DEVICE — CANN SMPL SOFTECH BIFLO ETCO2 A/M 7FT

## (undated) DEVICE — BITEBLOCK ENDO W/STRAP 60F A/ LF DISP